# Patient Record
Sex: FEMALE | Race: WHITE | NOT HISPANIC OR LATINO | Employment: UNEMPLOYED | URBAN - METROPOLITAN AREA
[De-identification: names, ages, dates, MRNs, and addresses within clinical notes are randomized per-mention and may not be internally consistent; named-entity substitution may affect disease eponyms.]

---

## 2021-03-20 ENCOUNTER — HOSPITAL ENCOUNTER (EMERGENCY)
Facility: HOSPITAL | Age: 41
Discharge: HOME | End: 2021-03-20
Attending: EMERGENCY MEDICINE | Admitting: EMERGENCY MEDICINE
Payer: COMMERCIAL

## 2021-03-20 VITALS
RESPIRATION RATE: 18 BRPM | OXYGEN SATURATION: 99 % | TEMPERATURE: 98.4 F | DIASTOLIC BLOOD PRESSURE: 69 MMHG | HEART RATE: 90 BPM | SYSTOLIC BLOOD PRESSURE: 137 MMHG

## 2021-03-20 DIAGNOSIS — F41.8 ANXIETY ABOUT HEALTH: Primary | ICD-10-CM

## 2021-03-20 LAB
ALBUMIN SERPL BCP-MCNC: 4.4 G/DL (ref 3.5–5)
ALP SERPL-CCNC: 45 U/L (ref 46–116)
ALT SERPL W P-5'-P-CCNC: 30 U/L (ref 12–78)
AMPHETAMINES SERPL QL SCN: NEGATIVE
ANION GAP SERPL CALCULATED.3IONS-SCNC: 11 MMOL/L (ref 4–13)
AST SERPL W P-5'-P-CCNC: 17 U/L (ref 5–45)
BACTERIA UR QL AUTO: ABNORMAL /HPF
BARBITURATES UR QL: NEGATIVE
BASOPHILS # BLD AUTO: 0.08 THOUSANDS/ΜL (ref 0–0.1)
BASOPHILS NFR BLD AUTO: 1 % (ref 0–1)
BENZODIAZ UR QL: NEGATIVE
BILIRUB SERPL-MCNC: 0.3 MG/DL (ref 0.2–1)
BILIRUB UR QL STRIP: NEGATIVE
BUN SERPL-MCNC: 11 MG/DL (ref 5–25)
CALCIUM SERPL-MCNC: 8.9 MG/DL (ref 8.3–10.1)
CHLORIDE SERPL-SCNC: 104 MMOL/L (ref 100–108)
CK SERPL-CCNC: 72 U/L (ref 26–192)
CLARITY UR: CLEAR
CO2 SERPL-SCNC: 28 MMOL/L (ref 21–32)
COCAINE UR QL: NEGATIVE
COLOR UR: YELLOW
CREAT SERPL-MCNC: 0.95 MG/DL (ref 0.6–1.3)
CRP SERPL QL: 1.9 MG/L
EOSINOPHIL # BLD AUTO: 0.12 THOUSAND/ΜL (ref 0–0.61)
EOSINOPHIL NFR BLD AUTO: 2 % (ref 0–6)
ERYTHROCYTE [DISTWIDTH] IN BLOOD BY AUTOMATED COUNT: 12.6 % (ref 11.6–15.1)
EXT PREG TEST URINE: NEGATIVE
EXT. CONTROL ED NAV: NORMAL
GFR SERPL CREATININE-BSD FRML MDRD: 76 ML/MIN/1.73SQ M
GLUCOSE SERPL-MCNC: 89 MG/DL (ref 65–140)
GLUCOSE UR STRIP-MCNC: NEGATIVE MG/DL
HCT VFR BLD AUTO: 44.9 % (ref 34.8–46.1)
HGB BLD-MCNC: 14.6 G/DL (ref 11.5–15.4)
HGB UR QL STRIP.AUTO: ABNORMAL
IMM GRANULOCYTES # BLD AUTO: 0.02 THOUSAND/UL (ref 0–0.2)
IMM GRANULOCYTES NFR BLD AUTO: 0 % (ref 0–2)
KETONES UR STRIP-MCNC: NEGATIVE MG/DL
LEUKOCYTE ESTERASE UR QL STRIP: NEGATIVE
LYMPHOCYTES # BLD AUTO: 2.27 THOUSANDS/ΜL (ref 0.6–4.47)
LYMPHOCYTES NFR BLD AUTO: 28 % (ref 14–44)
MCH RBC QN AUTO: 29.7 PG (ref 26.8–34.3)
MCHC RBC AUTO-ENTMCNC: 32.5 G/DL (ref 31.4–37.4)
MCV RBC AUTO: 91 FL (ref 82–98)
METHADONE UR QL: NEGATIVE
MONOCYTES # BLD AUTO: 0.6 THOUSAND/ΜL (ref 0.17–1.22)
MONOCYTES NFR BLD AUTO: 7 % (ref 4–12)
NEUTROPHILS # BLD AUTO: 5.03 THOUSANDS/ΜL (ref 1.85–7.62)
NEUTS SEG NFR BLD AUTO: 62 % (ref 43–75)
NITRITE UR QL STRIP: NEGATIVE
NON-SQ EPI CELLS URNS QL MICRO: ABNORMAL /HPF
NRBC BLD AUTO-RTO: 0 /100 WBCS
OPIATES UR QL SCN: NEGATIVE
OXYCODONE+OXYMORPHONE UR QL SCN: NEGATIVE
PCP UR QL: NEGATIVE
PH UR STRIP.AUTO: 6 [PH]
PLATELET # BLD AUTO: 328 THOUSANDS/UL (ref 149–390)
PMV BLD AUTO: 9.4 FL (ref 8.9–12.7)
POTASSIUM SERPL-SCNC: 3.8 MMOL/L (ref 3.5–5.3)
PROT SERPL-MCNC: 8.4 G/DL (ref 6.4–8.2)
PROT UR STRIP-MCNC: NEGATIVE MG/DL
RBC # BLD AUTO: 4.91 MILLION/UL (ref 3.81–5.12)
RBC #/AREA URNS AUTO: ABNORMAL /HPF
SODIUM SERPL-SCNC: 143 MMOL/L (ref 136–145)
SP GR UR STRIP.AUTO: 1.02 (ref 1–1.03)
THC UR QL: POSITIVE
TSH SERPL DL<=0.05 MIU/L-ACNC: 1.45 UIU/ML (ref 0.36–3.74)
UROBILINOGEN UR QL STRIP.AUTO: 0.2 E.U./DL
WBC # BLD AUTO: 8.12 THOUSAND/UL (ref 4.31–10.16)
WBC #/AREA URNS AUTO: ABNORMAL /HPF

## 2021-03-20 PROCEDURE — 99283 EMERGENCY DEPT VISIT LOW MDM: CPT

## 2021-03-20 PROCEDURE — 80053 COMPREHEN METABOLIC PANEL: CPT | Performed by: EMERGENCY MEDICINE

## 2021-03-20 PROCEDURE — 81025 URINE PREGNANCY TEST: CPT | Performed by: EMERGENCY MEDICINE

## 2021-03-20 PROCEDURE — 82550 ASSAY OF CK (CPK): CPT | Performed by: EMERGENCY MEDICINE

## 2021-03-20 PROCEDURE — 86140 C-REACTIVE PROTEIN: CPT | Performed by: EMERGENCY MEDICINE

## 2021-03-20 PROCEDURE — 85025 COMPLETE CBC W/AUTO DIFF WBC: CPT | Performed by: EMERGENCY MEDICINE

## 2021-03-20 PROCEDURE — 84443 ASSAY THYROID STIM HORMONE: CPT | Performed by: EMERGENCY MEDICINE

## 2021-03-20 PROCEDURE — 80307 DRUG TEST PRSMV CHEM ANLYZR: CPT | Performed by: EMERGENCY MEDICINE

## 2021-03-20 PROCEDURE — 81001 URINALYSIS AUTO W/SCOPE: CPT | Performed by: EMERGENCY MEDICINE

## 2021-03-20 PROCEDURE — 99284 EMERGENCY DEPT VISIT MOD MDM: CPT | Performed by: EMERGENCY MEDICINE

## 2021-03-20 PROCEDURE — 96360 HYDRATION IV INFUSION INIT: CPT

## 2021-03-20 PROCEDURE — 93005 ELECTROCARDIOGRAM TRACING: CPT

## 2021-03-20 PROCEDURE — 36415 COLL VENOUS BLD VENIPUNCTURE: CPT | Performed by: EMERGENCY MEDICINE

## 2021-03-20 RX ADMIN — SODIUM CHLORIDE 1000 ML: 0.9 INJECTION, SOLUTION INTRAVENOUS at 16:03

## 2021-03-20 NOTE — DISCHARGE INSTRUCTIONS
Let your PCP know that you were here today and that we did EKG and lab testing  Call [facility] to find local internal medicine doctor  Call [facility] and tell them you were here today and we would like you to be seen soon  Follow-up with the neurologist as scheduled

## 2021-03-20 NOTE — ED PROVIDER NOTES
History  Chief Complaint   Patient presents with    Allergic Reaction     pt reports feeling ill after being exposed to corn  states feeling shortness of breath and some "cognitive" issues  states she stops and doesnt remember where she placed things  reports being put on nyastatin and thinks that maybe part of her symptoms were from that as well      67-year-old female states that she has had problems for over 5 years that include difficulty concentrating, myalgias, arthralgias, loss of appetite, difficulty with sleep, early satiety and fatigue  These became worse one year ago when they moved into a home that apparently had toxic mold  She was seen by a holistic health practitioner at the end of January and was placed on multiple medications but states that she just felt worse afterwards  She is concerned that she has a corn allergy and the nystatin she was given contains corn so she stopped taking that last week  She states that she is having more difficulties with short-term memory lapses and fatigue  Last night she felt that her temples were swollen and tender bilaterally but that has resolved  She has not been eating or drinking very much  Her urine was dark brown a few days ago but started clearing with increased fluid intake  She and her  feel that she needs to be checked for dehydration or other acute abnormalities  She has an appointment to see the holistic healthcare practitioners soon  She has an appointment with Neurology in a few weeks  In the meantime the home mold problem has been rectified  No one else at home is ill  Malaise - 7 years or greater  Severity:  Moderate  Onset quality:  Gradual  Duration: years    Timing:  Constant  Progression:  Worsening  Chronicity:  Chronic  Context: change in medication, decreased sleep, dehydration and stress    Context: not alcohol use, not drug use, not increased activity and not pinched nerve    Relieved by:  Nothing  Worsened by: Activity  Ineffective treatments:  Eating, medication, rest, sleep, lying down and drinking fluids  Associated symptoms: abdominal pain, anorexia, arthralgias, dizziness, headaches, lethargy, myalgias, nausea, near-syncope and sensory-motor deficit    Associated symptoms: no ataxia, no chest pain, no cough, no diarrhea, no difficulty walking, no drooling, no dysphagia, no dysuria, no numbness in extremities, no falls, no fever, no foul-smelling urine, no frequency, no hematochezia, no loss of consciousness, no melena, no seizures, no shortness of breath, no stroke symptoms, no syncope, no urgency and no vomiting    Risk factors: new medications    Risk factors: no anemia, no congestive heart failure, no coronary artery disease, no diabetes and no neurologic disease        None       History reviewed  No pertinent past medical history  Past Surgical History:   Procedure Laterality Date    BREAST SURGERY      CHOLECYSTECTOMY         History reviewed  No pertinent family history  I have reviewed and agree with the history as documented  E-Cigarette/Vaping     E-Cigarette/Vaping Substances     Social History     Tobacco Use    Smoking status: Never Smoker    Smokeless tobacco: Never Used   Substance Use Topics    Alcohol use: Never     Frequency: Never    Drug use: Never       Review of Systems   Constitutional: Negative for fever  HENT: Negative for drooling  Respiratory: Negative for cough and shortness of breath  Cardiovascular: Positive for near-syncope  Negative for chest pain and syncope  Gastrointestinal: Positive for abdominal pain, anorexia and nausea  Negative for diarrhea, dysphagia, hematochezia, melena and vomiting  Genitourinary: Negative for dysuria, frequency and urgency  Musculoskeletal: Positive for arthralgias and myalgias  Negative for falls  Neurological: Positive for dizziness and headaches  Negative for seizures and loss of consciousness         Physical Exam  Physical Exam  Vitals signs and nursing note reviewed  Constitutional:       General: She is not in acute distress  Appearance: She is well-developed and normal weight  She is not ill-appearing, toxic-appearing or diaphoretic  HENT:      Head: Normocephalic and atraumatic  Right Ear: Tympanic membrane, ear canal and external ear normal       Left Ear: Tympanic membrane, ear canal and external ear normal       Nose: Nose normal  No congestion  Mouth/Throat:      Mouth: Mucous membranes are moist       Pharynx: Oropharynx is clear  No oropharyngeal exudate or posterior oropharyngeal erythema  Eyes:      General: No scleral icterus  Extraocular Movements: Extraocular movements intact  Conjunctiva/sclera: Conjunctivae normal       Pupils: Pupils are equal, round, and reactive to light  Neck:      Musculoskeletal: Normal range of motion and neck supple  No muscular tenderness  Vascular: No carotid bruit  Cardiovascular:      Rate and Rhythm: Normal rate and regular rhythm  Pulses: Normal pulses  Heart sounds: No murmur  Pulmonary:      Effort: Pulmonary effort is normal       Breath sounds: Normal breath sounds  Abdominal:      General: Bowel sounds are normal       Palpations: Abdomen is soft  There is no mass  Tenderness: There is no abdominal tenderness  There is no guarding or rebound  Musculoskeletal: Normal range of motion  General: No swelling, tenderness or deformity  Right lower leg: No edema  Left lower leg: No edema  Lymphadenopathy:      Cervical: No cervical adenopathy  Skin:     General: Skin is warm and dry  Capillary Refill: Capillary refill takes less than 2 seconds  Coloration: Skin is not pale  Findings: No rash  Neurological:      General: No focal deficit present  Mental Status: She is alert and oriented to person, place, and time  Mental status is at baseline  Cranial Nerves: No cranial nerve deficit  Sensory: No sensory deficit  Motor: No weakness  Coordination: Coordination normal       Gait: Gait normal       Deep Tendon Reflexes: Reflexes are normal and symmetric  Reflexes normal    Psychiatric:         Behavior: Behavior normal       Comments: Anxious  When discussing symptoms and course of disease pulse rate increases and speech becomes rapid and somewhat pressured  When distracted pulse rate returns to normal range         Vital Signs  ED Triage Vitals   Temperature Pulse Respirations Blood Pressure SpO2   03/20/21 1518 03/20/21 1518 03/20/21 1518 03/20/21 1518 03/20/21 1518   98 4 °F (36 9 °C) 91 20 145/93 99 %      Temp Source Heart Rate Source Patient Position - Orthostatic VS BP Location FiO2 (%)   03/20/21 1518 03/20/21 1518 03/20/21 1518 03/20/21 1518 --   Temporal Monitor Lying Left arm       Pain Score       03/20/21 1730       No Pain           Vitals:    03/20/21 1518 03/20/21 1730   BP: 145/93 137/69   Pulse: 91 90   Patient Position - Orthostatic VS: Lying Lying         Visual Acuity      ED Medications  Medications   sodium chloride 0 9 % bolus 1,000 mL (0 mL Intravenous Stopped 3/20/21 1710)       Diagnostic Studies  Results Reviewed     Procedure Component Value Units Date/Time    CK Total with Reflex CKMB [[id number]]  (Normal) Collected: 03/20/21 1606    Lab Status: Final result Specimen: Blood from Arm, Right Updated: 03/20/21 1710     Total CK 72 U/L     POCT pregnancy, urine [[id number]]  (Normal) Resulted: 03/20/21 1707    Lab Status: Final result Updated: 03/20/21 1707     EXT PREG TEST UR (Ref: Negative) negative     Control valid    TSH [[id number]]  (Normal) Collected: 03/20/21 1606    Lab Status: Final result Specimen: Blood from Arm, Right Updated: 03/20/21 1639     TSH 3RD GENERATON 1 451 uIU/mL     Narrative:      Patients undergoing fluorescein dye angiography may retain small amounts of fluorescein in the body for 48-72 hours post procedure   Samples containing fluorescein can produce falsely depressed TSH values  If the patient had this procedure,a specimen should be resubmitted post fluorescein clearance        Urine Microscopic [[id number]]  (Abnormal) Collected: 03/20/21 1606    Lab Status: Final result Specimen: Urine, Clean Catch Updated: 03/20/21 1635     RBC, UA 20-30 /hpf      WBC, UA 0-1 /hpf      Epithelial Cells Occasional /hpf      Bacteria, UA None Seen /hpf     Comprehensive metabolic panel [[id number]]  (Abnormal) Collected: 03/20/21 1606    Lab Status: Final result Specimen: Blood from Arm, Right Updated: 03/20/21 1630     Sodium 143 mmol/L      Potassium 3 8 mmol/L      Chloride 104 mmol/L      CO2 28 mmol/L      ANION GAP 11 mmol/L      BUN 11 mg/dL      Creatinine 0 95 mg/dL      Glucose 89 mg/dL      Calcium 8 9 mg/dL      AST 17 U/L      ALT 30 U/L      Alkaline Phosphatase 45 U/L      Total Protein 8 4 g/dL      Albumin 4 4 g/dL      Total Bilirubin 0 30 mg/dL      eGFR 76 ml/min/1 73sq m     Narrative:      [place] guidelines for Chronic Kidney Disease (CKD):     Stage 1 with normal or high GFR (GFR > 90 mL/min/1 73 square meters)    Stage 2 Mild CKD (GFR = 60-89 mL/min/1 73 square meters)    Stage 3A Moderate CKD (GFR = 45-59 mL/min/1 73 square meters)    Stage 3B Moderate CKD (GFR = 30-44 mL/min/1 73 square meters)    Stage 4 Severe CKD (GFR = 15-29 mL/min/1 73 square meters)    Stage 5 End Stage CKD (GFR <15 mL/min/1 73 square meters)  Note: GFR calculation is accurate only with a steady state creatinine    C-reactive protein [[id number]]  (Normal) Collected: 03/20/21 1606    Lab Status: Final result Specimen: Blood from Arm, Right Updated: 03/20/21 1630     CRP 1 9 mg/L     Rapid drug screen, urine [[id number]]  (Abnormal) Collected: 03/20/21 1606    Lab Status: Final result Specimen: Urine, Clean Catch Updated: 03/20/21 1628     Amph/Meth UR Negative     Barbiturate Ur Negative     Benzodiazepine Urine Negative Cocaine Urine Negative     Methadone Urine Negative     Opiate Urine Negative     PCP Ur Negative     THC Urine Positive     Oxycodone Urine Negative    Narrative:      Presumptive report  If requested, specimen will be sent to reference lab for confirmation  FOR MEDICAL PURPOSES ONLY  IF CONFIRMATION NEEDED PLEASE CONTACT THE LAB WITHIN 5 DAYS      Drug Screen Cutoff Levels:  AMPHETAMINE/METHAMPHETAMINES  1000 ng/mL  BARBITURATES     200 ng/mL  BENZODIAZEPINES     200 ng/mL  COCAINE      300 ng/mL  METHADONE      300 ng/mL  OPIATES      300 ng/mL  PHENCYCLIDINE     25 ng/mL  THC       50 ng/mL  OXYCODONE      100 ng/mL    UA (URINE) with reflex to Scope [[id number]]  (Abnormal) Collected: 03/20/21 1606    Lab Status: Final result Specimen: Urine, Clean Catch Updated: 03/20/21 1620     Color, UA Yellow     Clarity, UA Clear     Specific [place], UA 1 020     pH, UA 6 0     Leukocytes, UA Negative     Nitrite, UA Negative     Protein, UA Negative mg/dl      Glucose, UA Negative mg/dl      Ketones, UA Negative mg/dl      Urobilinogen, UA 0 2 E U /dl      Bilirubin, UA Negative     Blood, UA Large    CBC and differential [[id number]] Collected: 03/20/21 1606    Lab Status: Final result Specimen: Blood from Arm, Right Updated: 03/20/21 1612     WBC 8 12 Thousand/uL      RBC 4 91 Million/uL      Hemoglobin 14 6 g/dL      Hematocrit 44 9 %      MCV 91 fL      MCH 29 7 pg      MCHC 32 5 g/dL      RDW 12 6 %      MPV 9 4 fL      Platelets 395 Thousands/uL      nRBC 0 /100 WBCs      Neutrophils Relative 62 %      Immat GRANS % 0 %      Lymphocytes Relative 28 %      Monocytes Relative 7 %      Eosinophils Relative 2 %      Basophils Relative 1 %      Neutrophils Absolute 5 03 Thousands/µL      Immature Grans Absolute 0 02 Thousand/uL      Lymphocytes Absolute 2 27 Thousands/µL      Monocytes Absolute 0 60 Thousand/µL      Eosinophils Absolute 0 12 Thousand/µL      Basophils Absolute 0 08 Thousands/µL                  No orders to display              Procedures  ECG 12 Lead Documentation Only    Date/Time: 3/20/2021 3:36 PM  Performed by: Daxa Campo DO  Authorized by: Daxa Campo DO     ECG reviewed by me, the ED Provider: yes    Patient location:  ED  Previous ECG:     Previous ECG:  Unavailable    Comparison to cardiac monitor: Yes    Rhythm:     Rhythm: sinus tachycardia    Ectopy:     Ectopy: none    QRS:     QRS axis:  Normal    QRS intervals:  Normal  Conduction:     Conduction: normal    ST segments:     ST segments:  Normal  T waves:     T waves: normal               ED Course        vital signs remained stable other than transient tachycardia when discussing symptoms  No focal neurologic deficits exhibited  Had long discussion with patient about possible causes and plans for follow-up  Instructed to call in filling to find local Internal Medicine physician, to follow up with Neurology and with her holistic health practitioners  Also recommended follow-up with gastroenterologist for ongoing GI issues  MDM  Number of Diagnoses or Management Options  Anxiety about health: established and worsening  Diagnosis management comments: Patient has multiple complaints the she believes are referable to toxic mold in the house that has since been remediated  Patient concerned that some medications given to her by her holistic practitioner may be causing her allergies  On exam and per diagnostic testing results patient has no evidence of acute neurologic, infectious or cardiopulmonary disorder  Patient will need further workup as an outpatient  Stable for discharge         Amount and/or Complexity of Data Reviewed  Clinical lab tests: ordered and reviewed  Independent visualization of images, tracings, or specimens: yes        Disposition  Final diagnoses:   Anxiety about health     Time reflects when diagnosis was documented in both MDM as applicable and the Disposition within this note     Time User Action Codes Description Comment    3/20/2021  5:23 PM Gildardola Stacey Add [F41 8] Anxiety about health       ED Disposition     ED Disposition Condition Date/Time Comment    Discharge Stable Sat Mar 20, 2021  5:23 PM Arin Speak discharge to home/self care  Follow-up Information     Follow up With Specialties Details Why Contact Info Additional Information    Khushboo Arriaza DO Family Medicine Call in 2 days  [address] [facility] [address]  [place] [address] [facility] Schedule an appointment as soon as possible for a visit   [address] [place] [phone]  [facility] [address] [place] [address], [address] [place] [address], [id number] [phone] [address]  Call in 2 days To find a local internal medicine doctor [phone]             There are no discharge medications for this patient  No discharge procedures on file      PDMP Review       Value Time User    PDMP Reviewed  Yes 3/20/2021  5:23 PM Arie Us DO          ED Provider  Electronically Signed by           Arie Us DO  03/21/21 1141

## 2021-03-22 LAB
ATRIAL RATE: 108 BPM
P AXIS: 72 DEGREES
PR INTERVAL: 128 MS
QRS AXIS: 81 DEGREES
QRSD INTERVAL: 80 MS
QT INTERVAL: 342 MS
QTC INTERVAL: 458 MS
T WAVE AXIS: 38 DEGREES
VENTRICULAR RATE: 108 BPM

## 2021-03-22 PROCEDURE — 93010 ELECTROCARDIOGRAM REPORT: CPT | Performed by: INTERNAL MEDICINE

## 2021-09-01 ENCOUNTER — OFFICE VISIT (OUTPATIENT)
Dept: GASTROENTEROLOGY | Facility: CLINIC | Age: 41
End: 2021-09-01
Payer: COMMERCIAL

## 2021-09-01 VITALS
SYSTOLIC BLOOD PRESSURE: 118 MMHG | HEIGHT: 66 IN | DIASTOLIC BLOOD PRESSURE: 70 MMHG | BODY MASS INDEX: 22.73 KG/M2 | WEIGHT: 141.4 LBS

## 2021-09-01 DIAGNOSIS — R63.4 WEIGHT LOSS, UNINTENTIONAL: ICD-10-CM

## 2021-09-01 DIAGNOSIS — R10.13 EPIGASTRIC PAIN: Primary | ICD-10-CM

## 2021-09-01 DIAGNOSIS — Z86.010 PERSONAL HISTORY OF COLONIC POLYPS: ICD-10-CM

## 2021-09-01 DIAGNOSIS — Z91.09 ALLERGY TO MOLD SPORES: ICD-10-CM

## 2021-09-01 DIAGNOSIS — Z88.9 PERSONAL HISTORY OF ALLERGY TO MEDICINAL AGENT: ICD-10-CM

## 2021-09-01 PROCEDURE — 99204 OFFICE O/P NEW MOD 45 MIN: CPT | Performed by: INTERNAL MEDICINE

## 2021-09-01 RX ORDER — ACETAMINOPHEN 500 MG
500 TABLET ORAL
COMMUNITY

## 2021-09-01 RX ORDER — SODIUM PICOSULFATE, MAGNESIUM OXIDE, AND ANHYDROUS CITRIC ACID 10; 3.5; 12 MG/160ML; G/160ML; G/160ML
LIQUID ORAL
Qty: 320 ML | Refills: 0 | Status: SHIPPED | COMMUNITY
Start: 2021-09-01 | End: 2021-09-21 | Stop reason: HOSPADM

## 2021-09-01 RX ORDER — FAMOTIDINE 20 MG/1
20 TABLET, FILM COATED ORAL 2 TIMES DAILY
COMMUNITY
Start: 2021-08-12 | End: 2021-09-28

## 2021-09-01 NOTE — LETTER
September 2, 2021     Gonsalo Olivas, [address] [address]    Patient: Mabel Zhu   YOB: 1980   Date of Visit: 9/1/2021       Dear Dr Mohsen Campbell:    Thank you for referring Mabel Zhu to me for evaluation  Below are my notes for this consultation  If you have questions, please do not hesitate to call me  I look forward to following your patient along with you  Sincerely,        Bartolo Jennings MD        CC: No Recipients  Bartolo Jennings MD  9/2/2021  9:33 AM  Incomplete    [address] [facility]  Mabel Zhu 37 y o  female MRN: [id number]  Encounter: [id number]    ASSESSMENT AND PLAN:      1  Epigastric pain  --With a 6 month history of epigastric abdominal pain with radiation to the back  Partial relief with Pepcid  Some associated nausea  Has had weight loss as well  Previous intolerance and allergy to Prilosec  Differential diagnosis could include peptic ulcer disease however ulcers or typically healed within several weeks of treatment with famotidine, gastritis, H pylori infection, in the differential diagnosis  Functional abdominal pain is a possibility  Pancreatic disease could be considered as well given the location    -- proceed with EGD at [address] abdomen complete; Future  - Amylase; Future  - Lipase; Future    2  Weight loss, unintentional  -- related to problem 1   - C-reactive protein; Future    3  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  --- patient with significant environmental exposure fungal toxins in recently purchased house  Patient is moved out from that the [place] as it undergoes remain ED age    3  Personal history of allergy to medicinal agent   previous history of reaction with rash to Prilosec    5 Personal history of colon polyps  - patient reports more loose stools in the last year so  No blood per rectum    Does report having had a colonoscopy about about 10 years ago and was told that she had colon polyps and that she should come back in 5 years  -has some associated change in bowel habit in the past year    --Colonnoscopy at [facility]    Followup Appointment:  3 mo  ______________________________________________________________________    Chief Complaint   Patient presents with    Abdominal Pain       HPI:   Emelia Mejia is a 37y o  year old female who presents  For evaluation of ongoing problems with abdominal pain  Patient reports since the spring of this year having fairly significant epigastric abdominal pain with radiation to the back  This is sometimes aggravated by eating  She does have some associated nausea  Patient was actually seen in emergency department at [facility] and [facility] back in the spring  Lab work in abdominal x-ray with performed  Patient was advised that she may have peptic ulcer disease or gastritis  He has had significant weight loss upwards of 35 lb because of trouble with eating  She has not had vomiting  There is no use of nonsteroidal anti-inflammatories  The patient did have an upper endoscopy in the remote past there is no known history of peptic ulcer disease  Over the last couple months the patient has been taking famotidine twice a day  She seems to feel that this has helped the discomfort  Pain remains continuous there and there most of the time  Patient has been under treatment for exposure to environmental toxins  Patient and her  purchased a new home in the spring of 2020  Patient began having more health problems subsequently and has tested positive for allergy to fungal spores and mycotoxins related to the patient's new home  May have been an apartment over the last several months while the home undergoes room ED a shin  She is receiving therapy for that by her physician but did have some side effects with the treatment      She had a course of steroids for her allergic reactions  Patient does report in the remote past she had been placed on omeprazole  Patient had allergic reaction from that medication with significant rash  She actually was  challenged again with the medication and the rash resulted  once more  Patient does report some change in her bowel habit  Specifically she reports having some loose stool  This was not the case about a year ago  There is no family history of colorectal cancer  She has not had any rectal bleeding  She informs me however that she had a colonoscopy about 10 years ago  Despite being quite young she reports having had a history of colon polyps  She was advised to follow up with a colonoscopy 5 years subsequently but this was not done  Historical Information   Past Medical History:   Diagnosis Date    GERD (gastroesophageal reflux disease)      Past Surgical History:   Procedure Laterality Date    BREAST SURGERY      CHOLECYSTECTOMY       Social History     Substance and Sexual Activity   Alcohol Use Never     Social History     Substance and Sexual Activity   Drug Use Never     Social History     Tobacco Use   Smoking Status Never Smoker   Smokeless Tobacco Never Used     Family History   Problem Relation Age of Onset    Brain cancer Mother     Stomach cancer Father     Autoimmune disease Father     No Known Problems Sister     No Known Problems Brother     No Known Problems Sister     No Known Problems Brother     Colon polyps Neg Hx     Colon cancer Neg Hx        Meds/Allergies     Current Outpatient Medications:     famotidine (PEPCID) 20 mg tablet    acetaminophen (TYLENOL) 500 mg tablet    Allergies   Allergen Reactions    Diclegis [Doxylamine-Pyridoxine] Hives    Doxycycline Hives    Nyamyc [Nystatin] GI Intolerance    Prilosec [Omeprazole] Hives       PHYSICAL EXAM:    Blood pressure 118/70, height 5' 6" (1 676 m), weight 64 1 kg (141 lb 6 4 oz)  Body mass index is 22 82 kg/m²    General Appearance: NAD, cooperative, alert- thin  Eyes: Anicteric,  Conjunctiva pink  ENT:  Normocephalic, atraumatic, normal mucosa  Neck:  Supple, symmetrical, trachea midline,   Resp:  Clear to auscultation bilaterally; no rales, rhonchi or wheezing; respirations unlabored   CV:  S1 S2, Regular rate and rhythm; no murmur, rub, or gallop  GI:  Soft,  Very mild epigastric abdominal tenderness no guarding or rebound, non-distended; normal bowel sounds; no masses, no organomegaly   Rectal: Deferred  Musculoskeletal: No cyanosis, clubbing or edema  Normal ROM  Skin:  No jaundice, rashes, or lesions   Heme/Lymph: No palpable cervical lymphadenopathy  Psych: Normal affect, good eye contact  Neuro: No gross deficits, AAOx3    Lab Results:   Lab Results   Component Value Date    WBC 8 12 03/20/2021    HGB 14 6 03/20/2021    HCT 44 9 03/20/2021    MCV 91 03/20/2021     03/20/2021     Lab Results   Component Value Date    K 3 8 03/20/2021     03/20/2021    CO2 28 03/20/2021    BUN 11 03/20/2021    CREATININE 0 95 03/20/2021    CALCIUM 8 9 03/20/2021    AST 17 03/20/2021    ALT 30 03/20/2021    ALKPHOS 45 (L) 03/20/2021    EGFR 76 03/20/2021           REVIEW OF SYSTEMS:    CONSTITUTIONAL: Denies any fever, chills, rigors, positive for involuntary weight loss  HEENT: No earache or tinnitus  Positive for I congestion  CARDIOVASCULAR:  Positive for palpitations  RESPIRATORY: Denies any cough, hemoptysis, shortness of breath or dyspnea on exertion  GASTROINTESTINAL: As noted in the History of Present Illness  GENITOURINARY: No problems with urination  Denies any hematuria or dysuria  NEUROLOGIC:  Positive for dizziness without vertigo positive for headaches  MUSCULOSKELETAL: Denies any muscle or joint pain  SKIN: Denies skin rashes or itching  ENDOCRINE: Denies excessive thirst  Denies intolerance to heat or cold  PSYCHOSOCIAL:  Positive for anxious feelings  Denies any recent memory loss       Chucho Navas MD  9/1/2021  8:38 AM  Sign when Signing Visit    [address] [facility] - Outpatient [facility]  Romeo Kumar 37 y o  female MRN: [id number]  Encounter: [id number]    ASSESSMENT AND PLAN:      1  Epigastric pain  --With a 6 month history of epigastric abdominal pain with radiation to the back  Partial relief with Pepcid  Some associated nausea  Has had weight loss as well  Previous intolerance and allergy to Prilosec  Differential diagnosis could include peptic ulcer disease however ulcers or typically healed within several weeks of treatment with famotidine, gastritis, H pylori infection, in the differential diagnosis  Functional abdominal pain is a possibility  Pancreatic disease could be considered as well given the location    -- proceed with EGD at [place] [address] 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  --- patient with significant environmental exposure fungal toxins in recently purchased house  Patient is moved out from that the [place] as it undergoes remain ED age    3  Personal history of allergy to medicinal agent   previous history of reaction with rash to Prilosec    5 Personal history of colon polyps  - patient reports more loose stools in the last year so  No blood per rectum    Does report having had a colonoscopy about about 10 years ago and was told that she had colon polyps and that she should come back in 5 years  --Colonnoscopy at [facility]    Followup Appointment:  3 mo  ______________________________________________________________________    Chief Complaint   Patient presents with    Abdominal Pain       HPI:   Romeo Kumar is a 37y o  year old female who presents ***    Historical Information   Past Medical History:   Diagnosis Date    GERD (gastroesophageal reflux disease)      Past Surgical History:   Procedure Laterality Date    BREAST SURGERY  CHOLECYSTECTOMY       Social History     Substance and Sexual Activity   Alcohol Use Never     Social History     Substance and Sexual Activity   Drug Use Never     Social History     Tobacco Use   Smoking Status Never Smoker   Smokeless Tobacco Never Used     Family History   Problem Relation Age of Onset    Brain cancer Mother     Stomach cancer Father     Autoimmune disease Father     No Known Problems Sister     No Known Problems Brother     No Known Problems Sister     No Known Problems Brother     Colon polyps Neg Hx     Colon cancer Neg Hx        Meds/Allergies     Current Outpatient Medications:     famotidine (PEPCID) 20 mg tablet    acetaminophen (TYLENOL) 500 mg tablet    Allergies   Allergen Reactions    Diclegis [Doxylamine-Pyridoxine] Hives    Doxycycline Hives    Nyamyc [Nystatin] GI Intolerance    Prilosec [Omeprazole] Hives       PHYSICAL EXAM:    Blood pressure 118/70, height 5' 6" (1 676 m), weight 64 1 kg (141 lb 6 4 oz)  Body mass index is 22 82 kg/m²  General Appearance: NAD, cooperative, alert  Eyes: Anicteric, PERRLA, EOMI  ENT:  Normocephalic, atraumatic, normal mucosa  Neck:  Supple, symmetrical, trachea midline,   Resp:  Clear to auscultation bilaterally; no rales, rhonchi or wheezing; respirations unlabored   CV:  S1 S2, Regular rate and rhythm; no murmur, rub, or gallop  GI:  Soft, non-tender, non-distended; normal bowel sounds; no masses, no organomegaly   Rectal: Deferred  Musculoskeletal: No cyanosis, clubbing or edema  Normal ROM    Skin:  No jaundice, rashes, or lesions   Heme/Lymph: No palpable cervical lymphadenopathy  Psych: Normal affect, good eye contact  Neuro: No gross deficits, AAOx3    Lab Results:   Lab Results   Component Value Date    WBC 8 12 03/20/2021    HGB 14 6 03/20/2021    HCT 44 9 03/20/2021    MCV 91 03/20/2021     03/20/2021     Lab Results   Component Value Date    K 3 8 03/20/2021     03/20/2021    CO2 28 03/20/2021    BUN 11 03/20/2021    CREATININE 0 95 03/20/2021    CALCIUM 8 9 03/20/2021    AST 17 03/20/2021    ALT 30 03/20/2021    ALKPHOS 45 (L) 03/20/2021    EGFR 76 03/20/2021     No results found for: IRON, TIBC, FERRITIN  No results found for: LIPASE    Radiology Results:   No results found  REVIEW OF SYSTEMS:    CONSTITUTIONAL: Denies any fever, chills, rigors, and weight loss  HEENT: No earache or tinnitus  Denies hearing loss or visual disturbances  CARDIOVASCULAR: No chest pain or palpitations  RESPIRATORY: Denies any cough, hemoptysis, shortness of breath or dyspnea on exertion  GASTROINTESTINAL: As noted in the History of Present Illness  GENITOURINARY: No problems with urination  Denies any hematuria or dysuria  NEUROLOGIC: No dizziness or vertigo, denies headaches  MUSCULOSKELETAL: Denies any muscle or joint pain  SKIN: Denies skin rashes or itching  ENDOCRINE: Denies excessive thirst  Denies intolerance to heat or cold  PSYCHOSOCIAL: Denies depression or anxiety  Denies any recent memory loss

## 2021-09-01 NOTE — PATIENT INSTRUCTIONS
[facility] - Outpatient Consultation  Evelio Mallory 37 y o  female MRN: [id number]  Encounter: [id number]    ASSESSMENT AND PLAN:      1  Epigastric pain  --With a 6 month history of epigastric abdominal pain with radiation to the back  Partial relief with Pepcid  Some associated nausea  Has had weight loss as well  Previous intolerance and allergy to Prilosec  Differential diagnosis could include peptic ulcer disease however ulcers or typically healed within several weeks of treatment with famotidine, gastritis, H pylori infection, in the differential diagnosis  Functional abdominal pain is a possibility  Pancreatic disease could be considered as well given the location    -- proceed with EGD at [address] abdomen complete; Future  - Amylase; Future  - Lipase; Future    2  Weight loss, unintentional  -- related to problem 1   - C-reactive protein; Future    3  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  --- patient with significant environmental exposure fungal toxins in recently purchased house  Patient is moved out from that the [place] as it undergoes remain ED age    3  Personal history of allergy to medicinal agent   previous history of reaction with rash to Prilosec    5 Personal history of colon polyps  - patient reports more loose stools in the last year so  No blood per rectum    Does report having had a colonoscopy about about 10 years ago and was told that she had colon polyps and that she should come back in 5 years  -has some associated change in bowel habit in the past year   --Colonnoscopy at [facility]    Followup Appointment:  3 mo Pt with depression, suicidal thoughts tonight.

## 2021-09-01 NOTE — H&P (VIEW-ONLY)
[facility] - Outpatient Consultation  Roxanne Dawson 37 y o  female MRN: [id number]  Encounter: [id number]    ASSESSMENT AND PLAN:      1  Epigastric pain  --With a 6 month history of epigastric abdominal pain with radiation to the back  Partial relief with Pepcid  Some associated nausea  Has had weight loss as well  Previous intolerance and allergy to Prilosec  Differential diagnosis could include peptic ulcer disease however ulcers or typically healed within several weeks of treatment with famotidine, gastritis, H pylori infection, in the differential diagnosis  Functional abdominal pain is a possibility  Pancreatic disease could be considered as well given the location    -- proceed with EGD at [place] [address] [facility] abdomen complete; Future  - Amylase; Future  - Lipase; Future    2  Weight loss, unintentional  -- related to problem 1   - C-reactive protein; Future    3  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  --- patient with significant environmental exposure fungal toxins in recently purchased house  Patient is moved out from that the [place] as it undergoes remain ED age    3  Personal history of allergy to medicinal agent   previous history of reaction with rash to Prilosec    5 Personal history of colon polyps  - patient reports more loose stools in the last year so  No blood per rectum  Does report having had a colonoscopy about about 10 years ago and was told that she had colon polyps and that she should come back in 5 years  -has some associated change in bowel habit in the past year    --Colonnoscopy at [facility]    Followup Appointment:  3 mo  ______________________________________________________________________    Chief Complaint   Patient presents with    Abdominal Pain       HPI:   Roxanne Dawson is a 37y o  year old female who presents  For evaluation of ongoing problems with abdominal pain    Patient reports since the spring of this year having fairly significant epigastric abdominal pain with radiation to the back  This is sometimes aggravated by eating  She does have some associated nausea  Patient was actually seen in emergency department at [address] [place] [address] back in the spring  Lab work in abdominal x-ray with performed  Patient was advised that she may have peptic ulcer disease or gastritis  He has had significant weight loss upwards of 35 lb because of trouble with eating  She has not had vomiting  There is no use of nonsteroidal anti-inflammatories  The patient did have an upper endoscopy in the remote past there is no known history of peptic ulcer disease  Over the last couple months the patient has been taking famotidine twice a day  She seems to feel that this has helped the discomfort  Pain remains continuous there and there most of the time  Patient has been under treatment for exposure to environmental toxins  Patient and her  purchased a new home in the spring of 2020  Patient began having more health problems subsequently and has tested positive for allergy to fungal spores and mycotoxins related to the patient's new home  May have been an apartment over the last several months while the home undergoes room ED a shin  She is receiving therapy for that by her physician but did have some side effects with the treatment  She had a course of steroids for her allergic reactions  Patient does report in the remote past she had been placed on omeprazole  Patient had allergic reaction from that medication with significant rash  She actually was  challenged again with the medication and the rash resulted  once more  Patient does report some change in her bowel habit  Specifically she reports having some loose stool  This was not the case about a year ago  There is no family history of colorectal cancer  She has not had any rectal bleeding  She informs me however that she had a colonoscopy about 10 years ago  Despite being quite young she reports having had a history of colon polyps  She was advised to follow up with a colonoscopy 5 years subsequently but this was not done  Historical Information   Past Medical History:   Diagnosis Date    GERD (gastroesophageal reflux disease)      Past Surgical History:   Procedure Laterality Date    BREAST SURGERY      CHOLECYSTECTOMY       Social History     Substance and Sexual Activity   Alcohol Use Never     Social History     Substance and Sexual Activity   Drug Use Never     Social History     Tobacco Use   Smoking Status Never Smoker   Smokeless Tobacco Never Used     Family History   Problem Relation Age of Onset    Brain cancer Mother     Stomach cancer Father     Autoimmune disease Father     No Known Problems Sister     No Known Problems Brother     No Known Problems Sister     No Known Problems Brother     Colon polyps Neg Hx     Colon cancer Neg Hx        Meds/Allergies     Current Outpatient Medications:     famotidine (PEPCID) 20 mg tablet    acetaminophen (TYLENOL) 500 mg tablet    Allergies   Allergen Reactions    Diclegis [Doxylamine-Pyridoxine] Hives    Doxycycline Hives    Nyamyc [Nystatin] GI Intolerance    Prilosec [Omeprazole] Hives       PHYSICAL EXAM:    Blood pressure 118/70, height 5' 6" (1 676 m), weight 64 1 kg (141 lb 6 4 oz)  Body mass index is 22 82 kg/m²  General Appearance: NAD, cooperative, alert- thin  Eyes: Anicteric,  Conjunctiva pink  ENT:  Normocephalic, atraumatic, normal mucosa  Neck:  Supple, symmetrical, trachea midline,   Resp:  Clear to auscultation bilaterally; no rales, rhonchi or wheezing; respirations unlabored   CV:  S1 S2, Regular rate and rhythm; no murmur, rub, or gallop  GI:  Soft,  Very mild epigastric abdominal tenderness no guarding or rebound, non-distended; normal bowel sounds; no masses, no organomegaly   Rectal: Deferred  Musculoskeletal: No cyanosis, clubbing or edema  Normal ROM    Skin:  No jaundice, rashes, or lesions   Heme/Lymph: No palpable cervical lymphadenopathy  Psych: Normal affect, good eye contact  Neuro: No gross deficits, AAOx3    Lab Results:   Lab Results   Component Value Date    WBC 8 12 03/20/2021    HGB 14 6 03/20/2021    HCT 44 9 03/20/2021    MCV 91 03/20/2021     03/20/2021     Lab Results   Component Value Date    K 3 8 03/20/2021     03/20/2021    CO2 28 03/20/2021    BUN 11 03/20/2021    CREATININE 0 95 03/20/2021    CALCIUM 8 9 03/20/2021    AST 17 03/20/2021    ALT 30 03/20/2021    ALKPHOS 45 (L) 03/20/2021    EGFR 76 03/20/2021           REVIEW OF SYSTEMS:    CONSTITUTIONAL: Denies any fever, chills, rigors, positive for involuntary weight loss  HEENT: No earache or tinnitus  Positive for I congestion  CARDIOVASCULAR:  Positive for palpitations  RESPIRATORY: Denies any cough, hemoptysis, shortness of breath or dyspnea on exertion  GASTROINTESTINAL: As noted in the History of Present Illness  GENITOURINARY: No problems with urination  Denies any hematuria or dysuria  NEUROLOGIC:  Positive for dizziness without vertigo positive for headaches  MUSCULOSKELETAL: Denies any muscle or joint pain  SKIN: Denies skin rashes or itching  ENDOCRINE: Denies excessive thirst  Denies intolerance to heat or cold  PSYCHOSOCIAL:  Positive for anxious feelings  Denies any recent memory loss

## 2021-09-01 NOTE — PROGRESS NOTES
[id number] - Outpatient Consultation  Alberta Hills 37 y o  female MRN: [id number]  Encounter: [id number]    ASSESSMENT AND PLAN:      1  Epigastric pain  --With a 6 month history of epigastric abdominal pain with radiation to the back  Partial relief with Pepcid  Some associated nausea  Has had weight loss as well  Previous intolerance and allergy to Prilosec  Differential diagnosis could include peptic ulcer disease however ulcers or typically healed within several weeks of treatment with famotidine, gastritis, H pylori infection, in the differential diagnosis  Functional abdominal pain is a possibility  Pancreatic disease could be considered as well given the location    -- proceed with EGD at [place] [id number] abdomen complete; Future  - Amylase; Future  - Lipase; Future    2  Weight loss, unintentional  -- related to problem 1   - C-reactive protein; Future    3  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  --- patient with significant environmental exposure fungal toxins in recently purchased house  Patient is moved out from that the [place] as it undergoes remain ED age    3  Personal history of allergy to medicinal agent   previous history of reaction with rash to Prilosec    5 Personal history of colon polyps  - patient reports more loose stools in the last year so  No blood per rectum  Does report having had a colonoscopy about about 10 years ago and was told that she had colon polyps and that she should come back in 5 years  -has some associated change in bowel habit in the past year    --Colonnoscopy at [facility]    Followup Appointment:  3 mo  ______________________________________________________________________    Chief Complaint   Patient presents with    Abdominal Pain       HPI:   Alberta Hills is a 37y o  year old female who presents  For evaluation of ongoing problems with abdominal pain    Patient reports since the spring of this year having fairly significant epigastric abdominal pain with radiation to the back  This is sometimes aggravated by eating  She does have some associated nausea  Patient was actually seen in emergency department at [facility] back in the spring  Lab work in abdominal x-ray with performed  Patient was advised that she may have peptic ulcer disease or gastritis  He has had significant weight loss upwards of 35 lb because of trouble with eating  She has not had vomiting  There is no use of nonsteroidal anti-inflammatories  The patient did have an upper endoscopy in the remote past there is no known history of peptic ulcer disease  Over the last couple months the patient has been taking famotidine twice a day  She seems to feel that this has helped the discomfort  Pain remains continuous there and there most of the time  Patient has been under treatment for exposure to environmental toxins  Patient and her  purchased a new home in the spring of 2020  Patient began having more health problems subsequently and has tested positive for allergy to fungal spores and mycotoxins related to the patient's new home  May have been an apartment over the last several months while the home undergoes room ED a shin  She is receiving therapy for that by her physician but did have some side effects with the treatment  She had a course of steroids for her allergic reactions  Patient does report in the remote past she had been placed on omeprazole  Patient had allergic reaction from that medication with significant rash  She actually was  challenged again with the medication and the rash resulted  once more  Patient does report some change in her bowel habit  Specifically she reports having some loose stool  This was not the case about a year ago  There is no family history of colorectal cancer  She has not had any rectal bleeding  She informs me however that she had a colonoscopy about 10 years ago  Despite being quite young she reports having had a history of colon polyps  She was advised to follow up with a colonoscopy 5 years subsequently but this was not done  Historical Information   Past Medical History:   Diagnosis Date    GERD (gastroesophageal reflux disease)      Past Surgical History:   Procedure Laterality Date    BREAST SURGERY      CHOLECYSTECTOMY       Social History     Substance and Sexual Activity   Alcohol Use Never     Social History     Substance and Sexual Activity   Drug Use Never     Social History     Tobacco Use   Smoking Status Never Smoker   Smokeless Tobacco Never Used     Family History   Problem Relation Age of Onset    Brain cancer Mother     Stomach cancer Father     Autoimmune disease Father     No Known Problems Sister     No Known Problems Brother     No Known Problems Sister     No Known Problems Brother     Colon polyps Neg Hx     Colon cancer Neg Hx        Meds/Allergies     Current Outpatient Medications:     famotidine (PEPCID) 20 mg tablet    acetaminophen (TYLENOL) 500 mg tablet    Allergies   Allergen Reactions    Diclegis [Doxylamine-Pyridoxine] Hives    Doxycycline Hives    Nyamyc [Nystatin] GI Intolerance    Prilosec [Omeprazole] Hives       PHYSICAL EXAM:    Blood pressure 118/70, height 5' 6" (1 676 m), weight 64 1 kg (141 lb 6 4 oz)  Body mass index is 22 82 kg/m²  General Appearance: NAD, cooperative, alert- thin  Eyes: Anicteric,  Conjunctiva pink  ENT:  Normocephalic, atraumatic, normal mucosa  Neck:  Supple, symmetrical, trachea midline,   Resp:  Clear to auscultation bilaterally; no rales, rhonchi or wheezing; respirations unlabored   CV:  S1 S2, Regular rate and rhythm; no murmur, rub, or gallop  GI:  Soft,  Very mild epigastric abdominal tenderness no guarding or rebound, non-distended; normal bowel sounds; no masses, no organomegaly   Rectal: Deferred  Musculoskeletal: No cyanosis, clubbing or edema  Normal ROM    Skin:  No jaundice, rashes, or lesions   Heme/Lymph: No palpable cervical lymphadenopathy  Psych: Normal affect, good eye contact  Neuro: No gross deficits, AAOx3    Lab Results:   Lab Results   Component Value Date    WBC 8 12 03/20/2021    HGB 14 6 03/20/2021    HCT 44 9 03/20/2021    MCV 91 03/20/2021     03/20/2021     Lab Results   Component Value Date    K 3 8 03/20/2021     03/20/2021    CO2 28 03/20/2021    BUN 11 03/20/2021    CREATININE 0 95 03/20/2021    CALCIUM 8 9 03/20/2021    AST 17 03/20/2021    ALT 30 03/20/2021    ALKPHOS 45 (L) 03/20/2021    EGFR 76 03/20/2021           REVIEW OF SYSTEMS:    CONSTITUTIONAL: Denies any fever, chills, rigors, positive for involuntary weight loss  HEENT: No earache or tinnitus  Positive for I congestion  CARDIOVASCULAR:  Positive for palpitations  RESPIRATORY: Denies any cough, hemoptysis, shortness of breath or dyspnea on exertion  GASTROINTESTINAL: As noted in the History of Present Illness  GENITOURINARY: No problems with urination  Denies any hematuria or dysuria  NEUROLOGIC:  Positive for dizziness without vertigo positive for headaches  MUSCULOSKELETAL: Denies any muscle or joint pain  SKIN: Denies skin rashes or itching  ENDOCRINE: Denies excessive thirst  Denies intolerance to heat or cold  PSYCHOSOCIAL:  Positive for anxious feelings  Denies any recent memory loss

## 2021-09-01 NOTE — LETTER
September 2, 2021     Lili Murphy, [address] [address]    Patient: Gerardo Shipman   YOB: 1980   Date of Visit: 9/1/2021       Dear Dr Aunpam Kendrick:    Thank you for referring Gerardo Shipman to me for evaluation  Below are my notes for this consultation  If you have questions, please do not hesitate to call me  I look forward to following your patient along with you  Sincerely,        Yousif Paiz MD        CC: No Recipients  Yousif Paiz MD  9/2/2021  9:34 AM  Sign when Signing Visit    [facility] - Outpatient Consultation  Gerardo Shipman 37 y o  female MRN: [id number]  Encounter: [id number]    ASSESSMENT AND PLAN:      1  Epigastric pain  --With a 6 month history of epigastric abdominal pain with radiation to the back  Partial relief with Pepcid  Some associated nausea  Has had weight loss as well  Previous intolerance and allergy to Prilosec  Differential diagnosis could include peptic ulcer disease however ulcers or typically healed within several weeks of treatment with famotidine, gastritis, H pylori infection, in the differential diagnosis  Functional abdominal pain is a possibility  Pancreatic disease could be considered as well given the location    -- proceed with EGD at [address] abdomen complete; Future  - Amylase; Future  - Lipase; Future    2  Weight loss, unintentional  -- related to problem 1   - C-reactive protein; Future    3  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  --- patient with significant environmental exposure fungal toxins in recently purchased house  Patient is moved out from that the [place] as it undergoes remain ED age    3  Personal history of allergy to medicinal agent   previous history of reaction with rash to Prilosec    5 Personal history of colon polyps  - patient reports more loose stools in the last year so  No blood per rectum    Does report having had a colonoscopy about about 10 years ago and was told that she had colon polyps and that she should come back in 5 years  -has some associated change in bowel habit in the past year    --Colonnoscopy at [facility]    Followup Appointment:  3 mo  ______________________________________________________________________    Chief Complaint   Patient presents with    Abdominal Pain       HPI:   Sigifredo Brennan is a 37y o  year old female who presents  For evaluation of ongoing problems with abdominal pain  Patient reports since the spring of this year having fairly significant epigastric abdominal pain with radiation to the back  This is sometimes aggravated by eating  She does have some associated nausea  Patient was actually seen in emergency department at [facility] in the spring  Lab work in abdominal x-ray with performed  Patient was advised that she may have peptic ulcer disease or gastritis  He has had significant weight loss upwards of 35 lb because of trouble with eating  She has not had vomiting  There is no use of nonsteroidal anti-inflammatories  The patient did have an upper endoscopy in the remote past there is no known history of peptic ulcer disease  Over the last couple months the patient has been taking famotidine twice a day  She seems to feel that this has helped the discomfort  Pain remains continuous there and there most of the time  Patient has been under treatment for exposure to environmental toxins  Patient and her  purchased a new home in the spring of 2020  Patient began having more health problems subsequently and has tested positive for allergy to fungal spores and mycotoxins related to the patient's new home  May have been an apartment over the last several months while the home undergoes room ED a shin  She is receiving therapy for that by her physician but did have some side effects with the treatment      She had a course of steroids for her allergic reactions  Patient does report in the remote past she had been placed on omeprazole  Patient had allergic reaction from that medication with significant rash  She actually was  challenged again with the medication and the rash resulted  once more  Patient does report some change in her bowel habit  Specifically she reports having some loose stool  This was not the case about a year ago  There is no family history of colorectal cancer  She has not had any rectal bleeding  She informs me however that she had a colonoscopy about 10 years ago  Despite being quite young she reports having had a history of colon polyps  She was advised to follow up with a colonoscopy 5 years subsequently but this was not done  Historical Information   Past Medical History:   Diagnosis Date    GERD (gastroesophageal reflux disease)      Past Surgical History:   Procedure Laterality Date    BREAST SURGERY      CHOLECYSTECTOMY       Social History     Substance and Sexual Activity   Alcohol Use Never     Social History     Substance and Sexual Activity   Drug Use Never     Social History     Tobacco Use   Smoking Status Never Smoker   Smokeless Tobacco Never Used     Family History   Problem Relation Age of Onset    Brain cancer Mother     Stomach cancer Father     Autoimmune disease Father     No Known Problems Sister     No Known Problems Brother     No Known Problems Sister     No Known Problems Brother     Colon polyps Neg Hx     Colon cancer Neg Hx        Meds/Allergies     Current Outpatient Medications:     famotidine (PEPCID) 20 mg tablet    acetaminophen (TYLENOL) 500 mg tablet    Allergies   Allergen Reactions    Diclegis [Doxylamine-Pyridoxine] Hives    Doxycycline Hives    Nyamyc [Nystatin] GI Intolerance    Prilosec [Omeprazole] Hives       PHYSICAL EXAM:    Blood pressure 118/70, height 5' 6" (1 676 m), weight 64 1 kg (141 lb 6 4 oz)  Body mass index is 22 82 kg/m²    General Appearance: NAD, cooperative, alert- thin  Eyes: Anicteric,  Conjunctiva pink  ENT:  Normocephalic, atraumatic, normal mucosa  Neck:  Supple, symmetrical, trachea midline,   Resp:  Clear to auscultation bilaterally; no rales, rhonchi or wheezing; respirations unlabored   CV:  S1 S2, Regular rate and rhythm; no murmur, rub, or gallop  GI:  Soft,  Very mild epigastric abdominal tenderness no guarding or rebound, non-distended; normal bowel sounds; no masses, no organomegaly   Rectal: Deferred  Musculoskeletal: No cyanosis, clubbing or edema  Normal ROM  Skin:  No jaundice, rashes, or lesions   Heme/Lymph: No palpable cervical lymphadenopathy  Psych: Normal affect, good eye contact  Neuro: No gross deficits, AAOx3    Lab Results:   Lab Results   Component Value Date    WBC 8 12 03/20/2021    HGB 14 6 03/20/2021    HCT 44 9 03/20/2021    MCV 91 03/20/2021     03/20/2021     Lab Results   Component Value Date    K 3 8 03/20/2021     03/20/2021    CO2 28 03/20/2021    BUN 11 03/20/2021    CREATININE 0 95 03/20/2021    CALCIUM 8 9 03/20/2021    AST 17 03/20/2021    ALT 30 03/20/2021    ALKPHOS 45 (L) 03/20/2021    EGFR 76 03/20/2021           REVIEW OF SYSTEMS:    CONSTITUTIONAL: Denies any fever, chills, rigors, positive for involuntary weight loss  HEENT: No earache or tinnitus  Positive for I congestion  CARDIOVASCULAR:  Positive for palpitations  RESPIRATORY: Denies any cough, hemoptysis, shortness of breath or dyspnea on exertion  GASTROINTESTINAL: As noted in the History of Present Illness  GENITOURINARY: No problems with urination  Denies any hematuria or dysuria  NEUROLOGIC:  Positive for dizziness without vertigo positive for headaches  MUSCULOSKELETAL: Denies any muscle or joint pain  SKIN: Denies skin rashes or itching  ENDOCRINE: Denies excessive thirst  Denies intolerance to heat or cold  PSYCHOSOCIAL:  Positive for anxious feelings  Denies any recent memory loss

## 2021-09-20 ENCOUNTER — ANESTHESIA EVENT (OUTPATIENT)
Dept: GASTROENTEROLOGY | Facility: AMBULATORY SURGERY CENTER | Age: 41
End: 2021-09-20

## 2021-09-20 NOTE — ANESTHESIA PREPROCEDURE EVALUATION
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  Egg allergy (able to tolerate egg whites)         Physical Exam    Airway    Mallampati score: II  TM Distance: >3 FB  Neck ROM: full     Dental   No notable dental hx     Cardiovascular      Pulmonary      Other Findings        Anesthesia Plan  ASA Score- 2     Anesthesia Type- IV sedation with anesthesia with ASA Monitors  Additional Monitors:   Airway Plan:           Plan Factors-    Chart reviewed  Patient summary reviewed  Patient is not a current smoker  Patient did not smoke on day of surgery  Induction- intravenous  Postoperative Plan-     Informed Consent- Anesthetic plan and risks discussed with patient  I personally reviewed this patient with the CRNA  Discussed and agreed on the Anesthesia Plan with the CRNA  Nickolas Arriaga

## 2021-09-21 ENCOUNTER — HOSPITAL ENCOUNTER (OUTPATIENT)
Dept: GASTROENTEROLOGY | Facility: AMBULATORY SURGERY CENTER | Age: 41
Discharge: HOME | End: 2021-09-21
Payer: COMMERCIAL

## 2021-09-21 ENCOUNTER — ANESTHESIA (OUTPATIENT)
Dept: GASTROENTEROLOGY | Facility: AMBULATORY SURGERY CENTER | Age: 41
End: 2021-09-21

## 2021-09-21 VITALS
DIASTOLIC BLOOD PRESSURE: 63 MMHG | HEART RATE: 85 BPM | TEMPERATURE: 99.3 F | SYSTOLIC BLOOD PRESSURE: 108 MMHG | OXYGEN SATURATION: 100 % | RESPIRATION RATE: 15 BRPM

## 2021-09-21 DIAGNOSIS — Z86.010 HISTORY OF COLON POLYPS: ICD-10-CM

## 2021-09-21 DIAGNOSIS — R63.4 WEIGHT LOSS, UNINTENTIONAL: ICD-10-CM

## 2021-09-21 DIAGNOSIS — R10.13 EPIGASTRIC PAIN: ICD-10-CM

## 2021-09-21 LAB
EXT PREGNANCY TEST URINE: NEGATIVE
EXT. CONTROL: NORMAL

## 2021-09-21 PROCEDURE — 88313 SPECIAL STAINS GROUP 2: CPT | Performed by: PATHOLOGY

## 2021-09-21 PROCEDURE — 88341 IMHCHEM/IMCYTCHM EA ADD ANTB: CPT | Performed by: PATHOLOGY

## 2021-09-21 PROCEDURE — 43239 EGD BIOPSY SINGLE/MULTIPLE: CPT | Performed by: INTERNAL MEDICINE

## 2021-09-21 PROCEDURE — 88305 TISSUE EXAM BY PATHOLOGIST: CPT | Performed by: PATHOLOGY

## 2021-09-21 PROCEDURE — 45380 COLONOSCOPY AND BIOPSY: CPT | Performed by: INTERNAL MEDICINE

## 2021-09-21 PROCEDURE — 88342 IMHCHEM/IMCYTCHM 1ST ANTB: CPT | Performed by: PATHOLOGY

## 2021-09-21 RX ORDER — LIDOCAINE HYDROCHLORIDE 10 MG/ML
INJECTION, SOLUTION EPIDURAL; INFILTRATION; INTRACAUDAL; PERINEURAL AS NEEDED
Status: DISCONTINUED | OUTPATIENT
Start: 2021-09-21 | End: 2021-09-21

## 2021-09-21 RX ORDER — SODIUM CHLORIDE, SODIUM LACTATE, POTASSIUM CHLORIDE, CALCIUM CHLORIDE 600; 310; 30; 20 MG/100ML; MG/100ML; MG/100ML; MG/100ML
125 INJECTION, SOLUTION INTRAVENOUS CONTINUOUS
Status: DISCONTINUED | OUTPATIENT
Start: 2021-09-21 | End: 2021-09-21

## 2021-09-21 RX ORDER — PROPOFOL 10 MG/ML
INJECTION, EMULSION INTRAVENOUS AS NEEDED
Status: DISCONTINUED | OUTPATIENT
Start: 2021-09-21 | End: 2021-09-21

## 2021-09-21 RX ORDER — BUPROPION HYDROCHLORIDE 75 MG/1
TABLET ORAL
COMMUNITY
Start: 2021-09-07

## 2021-09-21 RX ADMIN — PROPOFOL 150 MG: 10 INJECTION, EMULSION INTRAVENOUS at 13:13

## 2021-09-21 RX ADMIN — SODIUM CHLORIDE, SODIUM LACTATE, POTASSIUM CHLORIDE, CALCIUM CHLORIDE: 600; 310; 30; 20 INJECTION, SOLUTION INTRAVENOUS at 13:43

## 2021-09-21 RX ADMIN — PROPOFOL 50 MG: 10 INJECTION, EMULSION INTRAVENOUS at 13:25

## 2021-09-21 RX ADMIN — PROPOFOL 50 MG: 10 INJECTION, EMULSION INTRAVENOUS at 13:20

## 2021-09-21 RX ADMIN — PROPOFOL 50 MG: 10 INJECTION, EMULSION INTRAVENOUS at 13:15

## 2021-09-21 RX ADMIN — PROPOFOL 50 MG: 10 INJECTION, EMULSION INTRAVENOUS at 13:37

## 2021-09-21 RX ADMIN — PROPOFOL 50 MG: 10 INJECTION, EMULSION INTRAVENOUS at 12:38

## 2021-09-21 RX ADMIN — PROPOFOL 50 MG: 10 INJECTION, EMULSION INTRAVENOUS at 13:31

## 2021-09-21 RX ADMIN — SODIUM CHLORIDE, SODIUM LACTATE, POTASSIUM CHLORIDE, CALCIUM CHLORIDE 125 ML/HR: 600; 310; 30; 20 INJECTION, SOLUTION INTRAVENOUS at 13:10

## 2021-09-21 RX ADMIN — PROPOFOL 30 MG: 10 INJECTION, EMULSION INTRAVENOUS at 13:43

## 2021-09-21 RX ADMIN — PROPOFOL 50 MG: 10 INJECTION, EMULSION INTRAVENOUS at 12:46

## 2021-09-21 RX ADMIN — PROPOFOL 50 MG: 10 INJECTION, EMULSION INTRAVENOUS at 13:34

## 2021-09-21 RX ADMIN — LIDOCAINE HYDROCHLORIDE 50 MG: 10 INJECTION, SOLUTION EPIDURAL; INFILTRATION; INTRACAUDAL; PERINEURAL at 13:13

## 2021-09-21 NOTE — ANESTHESIA POSTPROCEDURE EVALUATION
Post-Op Assessment Note    No complications documented      BP   110/55   Temp      Pulse  100   Resp   16   SpO2   100

## 2021-09-21 NOTE — DISCHARGE INSTRUCTIONS
Celiac Disease   WHAT YOU NEED TO KNOW:   What is celiac disease? Celiac disease is a long-term condition that affects your small intestine  Your immune system reacts to the protein gluten in food and damages your small intestine  You may not be able to absorb vitamins, minerals, and other nutrients from the foods you eat  What increases my risk of celiac disease? The cause of celiac disease is not known  You are at higher risk if you have another autoimmune disease, such as rheumatoid arthritis, or a family member with celiac disease  What are the signs and symptoms of celiac disease? The most common symptom of celiac disease is diarrhea that may smell bad or look oily  You may also have the following:  · Stomach pain, bloating, gas, and weight loss    · Weakness, low energy, and loss of appetite    · Bone pain or osteoporosis (bone loss)    · Missed monthly periods or difficulty getting pregnant    · Numbness or tingling in your legs and muscle cramps    · Mouth sores or a skin rash that itches    How is celiac disease diagnosed? · Blood tests  are used to check for antibodies to gluten  They may also be used to check for anemia and other deficiencies caused by celiac disease  · A sample of your bowel movement  is tested to see if you are absorbing nutrients from your diet  For this test, you will need to eat a high-fat diet for 1 day  Then you will need to collect your bowel movements for 2 days  The samples will be sent to a lab  · Small bowel barium x-rays  are pictures of your abdomen  You will need to swallow a thick liquid called barium  Barium helps the intestines show up better on the x-ray  · An endoscopic tissue biopsy  is a procedure to look at the inside of your small intestine  An endoscope is a bendable tube with a light and camera on the end  Healthcare providers may remove a small amount of tissue for a biopsy  How is celiac disease treated?   Since there is no cure for celiac disease, the goal of treatment is to reduce the symptoms  It may take up to 6 months or longer for your intestines to function better  You may need medicine such as steroids to suppress your immune system and decrease inflammation  How do I manage celiac disease? · Do not eat food that contains gluten  This is the most important way to manage your symptoms  Do not eat anything made with wheat, rye, barley, or oats  Gluten is found in additives in many packaged and restaurant foods  Read food labels or ask before you order food  A dietitian can help you plan meals that do not contain gluten  · Ask about supplements  You may need to take supplements that contain iron, folic acid, vitamin J03, calcium, or vitamin D  These may be given as a pill or through an IV  When should I contact my healthcare provider? · You have new symptoms or your symptoms get worse  · You have questions or concerns about your condition or care  When should I seek immediate care or call 911? · You have a fever  · You have severe abdominal pain  · You have blood in your bowel movement  CARE AGREEMENT:   You have the right to help plan your care  Learn about your health condition and how it may be treated  Discuss treatment options with your healthcare providers to decide what care you want to receive  You always have the right to refuse treatment  The above information is an  only  It is not intended as medical advice for individual conditions or treatments  Talk to your doctor, nurse or pharmacist before following any medical regimen to see if it is safe and effective for you  © Copyright [employer] 2021 Information is for End User's use only and may not be sold, redistributed or otherwise used for commercial purposes   All illustrations and images included in CareNotes® are the copyrighted property of A D A M [employer] Inc  or [employer] [facility]  Gastric Polyps   WHAT YOU NEED TO KNOW:   What are gastric polyps? Gastric polyps are growths that form in the lining of your stomach  They are not cancerous, but certain types of polyps can change into cancer  What puts me at risk for gastric polyps? · Chronic gastritis caused by NSAIDs use or ulcers    · Long-term use of proton pump inhibitor medicines (used to decrease stomach acid)    · An infection in your stomach caused by H  pylori bacteria    What are the symptoms of gastric polyps? You may have no symptoms  Large polyps may cause any of the following:  · Abdominal pain    · Indigestion    · Vomiting after meals or vomiting blood    · Dark or bloody bowel movements    How are gastric polyps diagnosed? Gastric polyps are usually found during an endoscopy for another reason  All or part of the polyp will be removed during the test  Your healthcare provider may also remove tissue from your stomach  The polyps and tissue are sent to the lab for testing  How are gastric polyps treated? Some types of polyps go away on their own  Other types may be removed if they are large, you have symptoms, or abnormal cells are found  Large polyps and abnormal cells increase your risk for cancer  You may also need antibiotics if you have an infection caused by H  pylori bacteria  Part of your stomach may be removed if the polyps cannot be removed and abnormal cells are found  When should I seek immediate care? · You have blood in your vomit  · You have dark or bloody bowel movements  · You have severe pain in your abdomen that does not go away after you take medicine  When should I contact my healthcare provider? · You have indigestion that does not go away with treatment  · You vomit after meals  · You have questions or concerns about your condition or care  CARE AGREEMENT:   You have the right to help plan your care  Learn about your health condition and how it may be treated   Discuss treatment options with your healthcare providers to decide what care you want to receive  You always have the right to refuse treatment  The above information is an  only  It is not intended as medical advice for individual conditions or treatments  Talk to your doctor, nurse or pharmacist before following any medical regimen to see if it is safe and effective for you  © Copyright [employer] 2021 Information is for End User's use only and may not be sold, redistributed or otherwise used for commercial purposes  All illustrations and images included in CareNotes® are the copyrighted property of [employer] M [employer]  or [employer]  Gastritis   WHAT YOU NEED TO KNOW:   What is gastritis? Gastritis is inflammation or irritation of the lining of your stomach  What increases my risk for gastritis? · Infection with bacteria, a virus, or a parasite    · NSAIDs, aspirin, or steroid medicine    · Use of tobacco products or alcohol    · Trauma such as an injury to your stomach or intestine    · Autoimmune disorders such as diabetes, thyroid disease, or Crohn disease    · Stress    · Age older than 60 years    · Illegal drugs, such as cocaine    What are the signs and symptoms of gastritis? · Stomach pain, burning, or tenderness when you press on it    · Stomach fullness or tightness    · Nausea or vomiting    · Loss of appetite, or feeling full quickly when you eat    · Bad breath    · Fatigue or feeling more tired than usual    · Heartburn    How is gastritis diagnosed? Your healthcare provider will ask about your signs and symptoms and examine you  You may need any of the following:  · Blood tests  may be used to show an infection, dehydration, or anemia (low red blood cell levels)  · A bowel movement sample  may be tested for blood or the germ that may be causing your gastritis  · A breath test  may show if H pylori is causing your gastritis  You will be given a liquid to drink  Then you will breathe into a bag   Your healthcare provider will measure the amount of carbon dioxide in your breath  Extra amounts of carbon dioxide may mean you have an H pylori infection  · An endoscopy  may be used to look for irritation or bleeding in your stomach  Your healthcare provider will use an endoscope (tube with a light and camera on the end) during the procedure  He or she may take a sample from your stomach to be tested  How is gastritis treated? Your symptoms may go away without treatment  Treatment will depend on what is causing your gastritis  Your healthcare provider may recommend changes to the medicines you take  Medicines may be given to help treat a bacterial infection or decrease stomach acid  How can I manage or prevent gastritis? · Do not smoke  Nicotine and other chemicals in cigarettes and cigars can make your symptoms worse and cause lung damage  Ask your healthcare provider for information if you currently smoke and need help to quit  E-cigarettes or smokeless tobacco still contain nicotine  Talk to your healthcare provider before you use these products  · Do not drink alcohol  Alcohol can prevent healing and make your gastritis worse  Talk to your healthcare provider if you need help to stop drinking  · Do not take NSAIDs or aspirin unless directed  These and similar medicines can cause irritation of your stomach lining  If your healthcare provider says it is okay to take NSAIDs, take them with food  · Do not eat foods that cause irritation  Foods such as oranges and salsa can cause burning or pain  Eat a variety of healthy foods  Examples include fruits (not citrus), vegetables, low-fat dairy products, beans, whole-grain breads, and lean meats and fish  Try to eat small meals, and drink water with your meals  Do not eat for at least 3 hours before you go to bed  · Find ways to relax and decrease stress  Stress can increase stomach acid and make gastritis worse   Activities such as yoga, meditation, or listening to music can help you relax  Spend time with friends, or do things you enjoy  Call 911 for any of the following:   · You develop chest pain or shortness of breath  When should I seek immediate care? · You vomit blood  · You have black or bloody bowel movements  · You have severe stomach or back pain  When should I contact my healthcare provider? · You have a fever  · You have new or worsening symptoms, even after treatment  · You have questions or concerns about your condition or care  CARE AGREEMENT:   You have the right to help plan your care  Learn about your health condition and how it may be treated  Discuss treatment options with your healthcare providers to decide what care you want to receive  You always have the right to refuse treatment  The above information is an  only  It is not intended as medical advice for individual conditions or treatments  Talk to your doctor, nurse or pharmacist before following any medical regimen to see if it is safe and effective for you  © Copyright [employer] 2021 Information is for End User's use only and may not be sold, redistributed or otherwise used for commercial purposes  All illustrations and images included in CareNotes® are the copyrighted property of [employer]  or [employer]  Helicobacter Pylori   WHAT YOU NEED TO KNOW:   What is a Helicobacter pylori (H  pylori) infection? H  pylori bacteria infect the lining of the stomach and upper intestine  People are usually infected with the bacteria as children but do not have symptoms until they are adults  What are the signs and symptoms of an H  pylori infection? Most people who are infected with H  pylori never have symptoms  If you do, your symptoms may come and go and last for minutes or hours  You may feel better for a short time after you eat food or take medicine   You may have any of the following:  · Dull or burning pain in your stomach or throat    · Nausea, vomiting, bloating, or burping    · Loss of appetite or weight loss    · Pain at night or with an empty stomach    How is an H  pylori infection diagnosed? · A urea breath test  means you will swallow pudding, liquid, or a capsule that contains a chemical  Then you will breathe into a container  Your breath sample will be tested for a reaction to the chemical that confirms H  pylori infection  · Bowel movement or blood samples  may be tested for infection  · Endoscopy  is a procedure that may be done if your healthcare provider thinks you have an ulcer  He or she will use a scope to see the inside of your stomach  A scope is a soft, flexible tube with a light and tiny camera on the end  It is passed down your throat and into your stomach  Samples of your stomach tissue may be removed and tested for H  pylori infection  How is an H  pylori infection treated? It is important to treat the infection  H  pylori may lead to changes in the cells of your esophagus or stomach  The cells are changed into intestine cells  This is a condition called intestinal metaplasia that increases your risk for cancer of the esophagus or stomach  The following may be used to treat an infection:  · Antibiotics  help kill the bacteria  You may need to take this medicine for 10 to 14 days  Your healthcare provider will prescribe at least 2 antibiotics at the same time  · Antiulcer medicines  help decrease the amount of acid that is normally made by the stomach  These help relieve pain and heal or prevent ulcers  · Bismuth  is a liquid or tablet that may be used to decrease heartburn, upset stomach, or diarrhea  It may also decrease swelling in your stomach and help kill the infection if other medicines do not work  It also protects ulcers from stomach acid so they can heal     How can I prevent or manage an H  pylori infection? · Wash your hands often    Infection can happen through contact with infected bowel movement, vomit, or saliva  Use soap and warm water  Use an alcohol-based gel if soap and water are not available  Clean your hands before you eat and after you use the bathroom  Clean your hands after you change a baby's diaper  · Handle food properly  Infection can happen if you drink water that is not clean or eat food that is not washed or cooked properly  Rinse food well before you cook or eat it  [employer] food all the way through  Proper handling will help kill any bacteria that may be on your food  · Drink clean water from a safe source  Only drink water that has been filtered or purified  · Ask about NSAIDs  NSAIDs can cause stomach bleeding and kidney problems if not taken correctly  Your healthcare provider may tell you to avoid these medicines because they can make your symptoms worse  · Do not smoke  Nicotine and other chemicals in cigarettes and cigars can worsen your symptoms  Ask your provider for information if you currently smoke and need help to quit  E-cigarettes or smokeless tobacco still contain nicotine  Talk to your provider before you use these products  · Do not drink alcohol  Alcohol may worsen your symptoms of heartburn  Alcohol also increases the risk for cancer of the esophagus or stomach  Ask your provider for information if you currently drink alcohol and need help to quit  When should I seek immediate care? · You have bloody bowel movements, bloody vomit, or vomit that looks like coffee grounds  · You have sudden, sharp stomach pain that does not go away or spreads to your back  When should I call my doctor? · Your symptoms do not improve with treatment  · You feel full after eating only a small amount of food  · You lose weight without trying  · You have questions or concerns about your condition or care  CARE AGREEMENT:   You have the right to help plan your care  Learn about your health condition and how it may be treated   Discuss treatment options with your healthcare providers to decide what care you want to receive  You always have the right to refuse treatment  The above information is an  only  It is not intended as medical advice for individual conditions or treatments  Talk to your doctor, nurse or pharmacist before following any medical regimen to see if it is safe and effective for you  © Copyright [employer] 2021 Information is for End User's use only and may not be sold, redistributed or otherwise used for commercial purposes  All illustrations and images included in CareNotes® are the copyrighted property of A D A M [employer] Inc  or [employer] [facility]  Microscopic Colitis   WHAT YOU NEED TO KNOW:   What is microscopic colitis? Microscopic colitis is long-term inflammation of your colon (large intestine)  Inflammation can damage the lining of your colon and cause long-term diarrhea  Microscopic colitis may be caused by an infection, higher levels of acid in your colon, or the cause may not be known  What increases my risk for microscopic colitis? · Older age    · Being female    · Increased bile acids    · Family history of microscopic colitis or another colon disease    · Bacterial infection    · Medicines for pain, depression, ulcers, or seizures    · Weakened immune system    What are the signs and symptoms of microscopic colitis? · Diarrhea    · Abdominal pain    · Fatigue     · Nausea or vomiting    · Weight loss    · Loss of bowel movement control    · Dehydration (thirst, dry mouth, or decreased urine)    How is microscopic colitis diagnosed? · Blood tests  may show if you have an infection  · A bowel movement sample  may show what germ is causing your illness  · A colonoscopy  may show what is causing your colitis  A tube with a light and camera on the end will be put into your anus, and moved forward into your intestine  How is microscopic colitis treated?   Medicines may be given to treat a bacterial infection, decrease inflammation in your colon, or treat diarrhea  You may also need medicine to decrease acid levels in your colon that could cause irritation  How can I manage my symptoms? · Eat a variety of healthy foods  Healthy foods include fruits, vegetables, whole-grain breads, low-fat dairy products, beans, lean meats, and fish  You may need to eat several small meals throughout the day  Avoid spicy foods, caffeine, chocolate, and foods high in fat  · Drink liquids as directed  to help prevent dehydration  Good liquids to drink include water, juice, and broth  Ask how much liquid to drink each day  You may need to drink an oral rehydration solution (ORS)  An ORS contains a balance of water, salt, and sugar to replace body fluids lost during diarrhea  · Start to exercise when you feel better  Regular exercise helps your bowels work normally  Ask about the best exercise plan for you  · Ask about probiotics  You may need supplements that help balance the bacteria in your colon  This may help decrease you symptoms  How can I help prevent microscopic colitis? · Wash your hands  Wash your hands in warm, soapy water for 20 seconds before and after you handle food  Wash your hands after you use the bathroom, change a diaper, or touch an animal      · Clean food and areas that come in contact with food  Rinse fruits and vegetables in running water  Clean cutting boards, knives, countertops, and other areas where you prepare food before and after you cook  Wash sponges and dishtowels weekly in hot water  · [employer] food all the way through  Cook eggs until the yolks are firm  Use a meat thermometer to make sure meat is heated to a temperature that will kill bacteria  Do not eat raw or undercooked chicken, turkey, seafood, or meat  · Store food properly  Refrigerate or freeze fruits and vegetables, cooked foods, and leftovers  · Drink safe water  Drink only treated water  Do not drink water from ponds or lakes, or from swimming pools that do not contain chlorine  Drink bottled water when traveling  Call 911 for any of the following:   · You have trouble breathing  When should I seek immediate care? · You have any of the following signs of severe dehydration:     ? Dizziness or weakness    ? Dry mouth, cracked lips, or severe thirst    ? Fast heartbeat or breathing    ? Passing little to no urine    · You have black or bright red stools  · You have blood in your vomit  · Your abdomen feels larger than normal, hard, and painful  When should I contact my healthcare provider? · You have a fever with abdominal pain that does not go away  · You have a fever, chills, cough, or feel weak and achy  · Your diarrhea gets worse  · Your symptoms do not improve or get worse  · You are losing weight without trying  · You have questions or concerns about your condition or care  CARE AGREEMENT:   You have the right to help plan your care  Learn about your health condition and how it may be treated  Discuss treatment options with your healthcare providers to decide what care you want to receive  You always have the right to refuse treatment  The above information is an  only  It is not intended as medical advice for individual conditions or treatments  Talk to your doctor, nurse or pharmacist before following any medical regimen to see if it is safe and effective for you  © Copyright [employer] 2021 Information is for End User's use only and may not be sold, redistributed or otherwise used for commercial purposes  All illustrations and images included in CareNotes® are the copyrighted property of [employer] M [employer] Inc  or [facility]   Colorectal Polyps   WHAT YOU NEED TO KNOW:   What are colorectal polyps? Colorectal polyps are small growths of tissue in the lining of the colon and rectum  Most polyps are usually benign (not cancer)   Certain types of polyps, called adenomatous polyps, may turn into cancer  What increases my risk for colorectal polyps? The exact cause of colorectal polyps is unknown  The following may increase your risk:  · Older age    · Foods high in fat and low in fiber    · Family history of polyps    · Intestinal diseases, such as Crohn disease or ulcerative colitis    · Smoking cigarettes or drinking alcohol    · Lack of physical activity, such as exercise    · Obesity    What are the signs and symptoms of colorectal polyps? · Blood in your bowel movement or bleeding from the rectum    · Change in bowel movement habits, such as diarrhea or constipation    · Abdominal pain    How are colorectal polyps diagnosed? You should have fecal blood screening 1 time each year for colorectal disease if you are older than 50 years  You should be screened earlier if you have an intestinal disease or a family history of polyps or colorectal cancer  During this screening, a sample of your bowel movement is checked for blood, which may be an early sign of colorectal polyps or cancer  You may also need any of the following tests:  · A digital rectal exam  means your healthcare provider will use a finger to check for polyps  · A barium enema  is an x-ray of the colon  A tube is put into your anus, and a liquid called barium is put through the tube  Barium is used so that healthcare providers can see your colon better on the x-ray film  · A virtual colonoscopy  is a CT scan that takes pictures of the inside of your colon and rectum  A small, flexible tube is put into your rectum and air or carbon dioxide (gas) is used to expand your colon  This lets healthcare providers clearly see your colon and any polyps on a monitor  · Colonoscopy or sigmoidoscopy  are procedures to help your healthcare provider see the inside of your colon  He or she will use a flexible tube with a small light and camera on the end   During a sigmoidoscopy, your healthcare provider will only look at rectum and lower colon  During a colonoscopy, he or she will look at the full length of your colon  A small amount of tissue may be removed from your colon for tests  How are colorectal polyps treated? Small, benign polyps may not need treatment  Your healthcare provider will check the polyp over time to make sure it is not changing  Polyps that are cancer may be removed with one of the following:  · A polypectomy  is a minimally invasive procedure to remove a polyp during a colonoscopy or sigmoidoscopy  Your healthcare provider may need to remove the polyp with a laparoscope  Laparoscopy is done by inserting a small, flexible scope into incisions made on your abdomen  · Surgery  may be needed to remove large or deep polyps that cannot be removed in a polypectomy  Tissues or lymph nodes near a polyp may also be removed  This helps stop cancer from spreading  What can I do to lower my risk for colorectal polyps? · Eat a variety of healthy foods  Healthy foods include fruit, vegetables, whole-grain breads, low-fat dairy products, beans, lean meat, and fish  Ask if you need to be on a special diet  · Maintain a healthy weight  Ask your healthcare provider what a healthy weight is for you  Ask him or her to help with a weight loss plan if you are overweight  · Exercise as directed  Begin to exercise slowly and do more as you get stronger  Talk with your healthcare provider before you start an exercise program          · Limit alcohol  Your risk for polyps increases the more you drink  · Do not smoke  Nicotine and other chemicals in cigarettes and cigars increase your risk for polyps  Ask your healthcare provider for information if you currently smoke and need help to quit  E-cigarettes or smokeless tobacco still contain nicotine  Talk to your healthcare provider before you use these products  Where can I find more information?    · National Digestive Diseases Information Clearinghouse (NDDIC)  [address] , [place] [address]  Phone: 2- 316 - 013-4883  Web Address: www digestive  [facility]    Call your local emergency number (911 in the [address]) if:   · You have sudden shortness of breath  · You have a fast heart rate, fast breathing, or are too dizzy to stand up  When should I seek immediate care? · You have severe abdominal pain  · You see blood in your bowel movement  When should I call my doctor? · You have a fever  · You have chills, a cough, or feel weak and achy  · You have abdominal pain that does not go away or gets worse after you take medicine  · Your abdomen is swollen  · You are losing weight without trying  · You have questions or concerns about your condition or care  CARE AGREEMENT:   You have the right to help plan your care  Learn about your health condition and how it may be treated  Discuss treatment options with your healthcare providers to decide what care you want to receive  You always have the right to refuse treatment  The above information is an  only  It is not intended as medical advice for individual conditions or treatments  Talk to your doctor, nurse or pharmacist before following any medical regimen to see if it is safe and effective for you  © Copyright [employer] 2021 Information is for End User's use only and may not be sold, redistributed or otherwise used for commercial purposes  All illustrations and images included in CareNotes® are the copyrighted property of A D A M , Inc  or [facility] Jose Wilhelm   Hemorrhoids   WHAT YOU NEED TO KNOW:   What are hemorrhoids? Hemorrhoids are swollen blood vessels inside your rectum (internal hemorrhoids) or on your anus (external hemorrhoids)  Sometimes a hemorrhoid may prolapse  This means it extends out of your anus  What increases my risk for hemorrhoids?    · Pregnancy or obesity    · Straining or sitting for a long time during bowel movements    · Liver disease    · Weak muscles around the anus caused by older age, rectal surgery, or anal intercourse    · A lack of physical activity    · Chronic diarrhea or constipation    · A low-fiber diet    What are the signs and symptoms of hemorrhoids? · Pain or itching around your anus or inside your rectum    · Swelling or bumps around your anus    · Bright red blood in your bowel movement, on the toilet paper, or in the toilet bowl    · Tissue bulging out of your anus (prolapsed hemorrhoids)    · Incontinence (poor control over urine or bowel movements)    How are hemorrhoids diagnosed? Your healthcare provider will ask about your symptoms, the foods you eat, and your bowel movements  He or she will examine your anus for external hemorrhoids  You may need the following:  · A digital rectal exam  is a test to check for hemorrhoids  Your healthcare provider will put a gloved finger inside your anus to feel for the hemorrhoids  · An anoscopy  is a test that uses a scope (small tube with a light and camera on the end) to look at your hemorrhoids  How are hemorrhoids treated? Treatment will depend on your symptoms  You may need any of the following:  · Medicines  can help decrease pain and swelling, and soften your bowel movement  The medicine may be a pill, pad, cream, or ointment  · Procedures  may be used to shrink or remove your hemorrhoid  Examples include rubber-band ligation, sclerotherapy, and photocoagulation  These procedures may be done in your healthcare provider's office  Ask your healthcare provider for more information about these procedures  · Surgery  may be needed to shrink or remove your hemorrhoids  How can I manage my symptoms? · Apply ice on your anus for 15 to 20 minutes every hour or as directed  Use an ice pack, or put crushed ice in a plastic bag  Cover it with a towel before you apply it to your anus   Ice helps prevent tissue damage and decreases swelling and pain  · Take a sitz bath  Fill a bathtub with 4 to 6 inches of warm water  You may also use a sitz bath pan that fits inside a toilet bowl  Sit in the sitz bath for 15 minutes  Do this 3 times a day, and after each bowel movement  The warm water can help decrease pain and swelling  · Keep your anal area clean  Gently wash the area with warm water daily  Soap may irritate the area  After a bowel movement, wipe with moist towelettes or wet toilet paper  Dry toilet paper can irritate the area  How can I help prevent hemorrhoids? · Do not strain to have a bowel movement  Do not sit on the toilet too long  These actions can increase pressure on the tissues in your rectum and anus  · Drink plenty of liquids  Liquids can help prevent constipation  Ask how much liquid to drink each day and which liquids are best for you  · Eat a variety of high-fiber foods  Examples include fruits, vegetables, and whole grains  Ask your healthcare provider how much fiber you need each day  You may need to take a fiber supplement  · Exercise as directed  Exercise, such as walking, may make it easier to have a bowel movement  Ask your healthcare provider to help you create an exercise plan  · Do not have anal sex  Anal sex can weaken the skin around your rectum and anus  · Avoid heavy lifting  This can cause straining and increase your risk for another hemorrhoid  When should I seek immediate care? · You have severe pain in your rectum or around your anus  · You have severe pain in your abdomen and you are vomiting  · You have bleeding from your anus that soaks through your underwear  When should I contact my healthcare provider? · You have frequent and painful bowel movements  · Your hemorrhoid looks or feels more swollen than usual      · You do not have a bowel movement for 2 days or more  · You see or feel tissue coming through your anus  · You have questions or concerns about your condition or care  CARE AGREEMENT:   You have the right to help plan your care  Learn about your health condition and how it may be treated  Discuss treatment options with your healthcare providers to decide what care you want to receive  You always have the right to refuse treatment  The above information is an  only  It is not intended as medical advice for individual conditions or treatments  Talk to your doctor, nurse or pharmacist before following any medical regimen to see if it is safe and effective for you  © Copyright [employer] 2021 Information is for End User's use only and may not be sold, redistributed or otherwise used for commercial purposes  All illustrations and images included in CareNotes® are the copyrighted property of A D A M , Inc  or [address] [place] [address] Melonie   Upper Endoscopy and Colonoscopy   WHAT YOU NEED TO KNOW:   An upper endoscopy is also called an upper gastrointestinal (GI) endoscopy, or an esophagogastroduodenoscopy (EGD)  It is a procedure to examine the inside of your esophagus, stomach, and duodenum (first part of the small intestine) with a scope  You may feel bloated, gassy, or have some abdominal discomfort after your procedure  Your throat may be sore for 24 to 36 hours  You may burp or pass gas from air that is still inside your body  A colonoscopy is a procedure to examine the inside of your colon (intestine) with a scope  Polyps or tissue growths may have been removed during your colonoscopy  It is normal to feel bloated and to have some abdominal discomfort  You should be passing gas  If you have hemorrhoids or you had polyps removed, you may have a small amount of bleeding  DISCHARGE INSTRUCTIONS:   Seek care immediately if:   · You have sudden, severe abdominal pain  · You have problems swallowing       · You have a large amount of black, sticky bowel movements or blood in your bowel movements  · You have sudden trouble breathing  · You feel weak, lightheaded, or faint or your heart beats faster than normal for you  Contact your healthcare provider if:   · You have a fever and chills  · You have nausea or are vomiting  · Your abdomen is bloated or feels full and hard  · You have abdominal pain  · You have a large amount of black, sticky bowel movements or blood in your bowel movements  · You have not had a bowel movement for 3 days after your procedure  · You have rash or hives  · You have questions or concerns about your procedure  Activity:   ·       Do not lift, strain, or run for 24 hours after your procedure  ·       Rest after your procedure  You have been given medicine to relax you  Do not drive or make important decisions until the day after your procedure  Return to your normal activity as directed  ·       Relieve gas and discomfort from bloating by lying on your right side with a heating pad on your abdomen  You may need to take short walks to help the gas move out  Eat small meals until bloating is relieved  Follow up with your healthcare provider as directed: Write down your questions so you remember to ask them during your visits  If you take a blood thinner, please review the specific instructions from your endoscopist about when you should resume it  These can be found in the Recommendation and Your Medication list sections of this After Visit Summary

## 2021-09-27 ENCOUNTER — TELEPHONE (OUTPATIENT)
Dept: GASTROENTEROLOGY | Facility: CLINIC | Age: 41
End: 2021-09-27

## 2021-09-27 NOTE — TELEPHONE ENCOUNTER
Tightness, squeezing in chest, has returned  Feels it most of the time  No n/v/fever  Some diarrhea  Feels like she is short of breath- like "I am being squished"  If she drinks coffee, or eats something with sacue, etc  It can get worse  On pepcid 20 mg BID since August     Is allergic to omeprazole  Patient is okay with waiting for KANIKA's response tomorrow

## 2021-09-27 NOTE — RESULT ENCOUNTER NOTE
Called the patient and reviewed results - gastric biopsy of submucosal lesion - some muscle tissue - checking if  patient may need EUS to further check - no h pylori or celiac -- colon - 1 serrated polyp - needs colonoscopy recall in 5 years   Left voice mail for the patient

## 2021-09-27 NOTE — TELEPHONE ENCOUNTER
"Called the patient and reviewed results - gastric biopsy of submucosal lesion - some muscle tissue - checking if  patient may need EUS to further check - no h pylori or celiac -- colon - 1 serrated polyp - needs colonoscopy recall in 5 years   Left voice mail for the patient "

## 2021-09-27 NOTE — TELEPHONE ENCOUNTER
Pt states she missed a call from Gene Mchugh this morn w/ bx results; was going to tell him that last wk after proc she started w/ discomfort after the proc and was going to talk to him when he called; reports intermittent pain under the center of her chest at rib cage that radiates to her back on Lf side  CB# [phone]  Pt aware Gene Mchugh not here

## 2021-09-28 ENCOUNTER — TELEPHONE (OUTPATIENT)
Dept: GASTROENTEROLOGY | Facility: CLINIC | Age: 41
End: 2021-09-28

## 2021-09-28 DIAGNOSIS — K21.9 GASTROESOPHAGEAL REFLUX DISEASE WITHOUT ESOPHAGITIS: Primary | ICD-10-CM

## 2021-09-28 RX ORDER — FAMOTIDINE 40 MG/1
40 TABLET, FILM COATED ORAL 2 TIMES DAILY
Qty: 60 TABLET | Refills: 5 | Status: SHIPPED | OUTPATIENT
Start: 2021-09-28 | End: 2021-12-22

## 2021-09-28 NOTE — TELEPHONE ENCOUNTER
Called the patient and reviewed symptoms  She seems to be little more with spicy foods her coffee  Some spasmodic type aspect of the pain  Seems to get worse as the day goes along  Will try famotidine 40 mg twice a day  Also discussed about her endoscopic biopsies  Was in touch with Dr Primitivo Johns- reasonable to put patient in for endoscopic ultrasound  Will arrange  Will also change her famotidine prescription

## 2021-09-28 NOTE — TELEPHONE ENCOUNTER
Did advise patient about her symptoms of squeezing the chest worse with spicy foods and coffee  Seems to have done fairly well Pepcid  Worse as the day goes along  Try famotidine 40 twice a day  If not effective will go with Bentyl 10 t i d  p r n  Ru Barnes Patient to see Dr Dr Maxwell Montanez per endoscopic ultrasound with respect to the submucosal antral lesion    Told the patient his office would likely get in touch with her

## 2021-10-04 ENCOUNTER — PREP FOR PROCEDURE (OUTPATIENT)
Dept: GASTROENTEROLOGY | Facility: CLINIC | Age: 41
End: 2021-10-04

## 2021-10-04 DIAGNOSIS — K21.9 GASTROESOPHAGEAL REFLUX DISEASE WITHOUT ESOPHAGITIS: Primary | ICD-10-CM

## 2021-10-04 NOTE — TELEPHONE ENCOUNTER
Eus on 12/16/21 with Dr Kellen Arellano at [facility]  Prep instructions mailed to the patient  Did offer sooner appointment with Dr Yoana Ruff and will speak to Dr Octavia Siemens and see if December is ok or to schedule procedure sooner

## 2021-11-16 ENCOUNTER — TELEPHONE (OUTPATIENT)
Dept: GASTROENTEROLOGY | Facility: CLINIC | Age: 41
End: 2021-11-16

## 2021-12-15 ENCOUNTER — TELEPHONE (OUTPATIENT)
Dept: GASTROENTEROLOGY | Facility: HOSPITAL | Age: 41
End: 2021-12-15

## 2021-12-16 ENCOUNTER — ANESTHESIA (OUTPATIENT)
Dept: GASTROENTEROLOGY | Facility: HOSPITAL | Age: 41
End: 2021-12-16

## 2021-12-16 ENCOUNTER — HOSPITAL ENCOUNTER (OUTPATIENT)
Dept: GASTROENTEROLOGY | Facility: HOSPITAL | Age: 41
Setting detail: OUTPATIENT SURGERY
Discharge: HOME | End: 2021-12-16
Attending: INTERNAL MEDICINE | Admitting: INTERNAL MEDICINE
Payer: COMMERCIAL

## 2021-12-16 ENCOUNTER — ANESTHESIA EVENT (OUTPATIENT)
Dept: GASTROENTEROLOGY | Facility: HOSPITAL | Age: 41
End: 2021-12-16

## 2021-12-16 VITALS
TEMPERATURE: 97.3 F | OXYGEN SATURATION: 98 % | DIASTOLIC BLOOD PRESSURE: 62 MMHG | SYSTOLIC BLOOD PRESSURE: 112 MMHG | HEART RATE: 80 BPM | RESPIRATION RATE: 18 BRPM

## 2021-12-16 DIAGNOSIS — K21.9 GASTROESOPHAGEAL REFLUX DISEASE WITHOUT ESOPHAGITIS: ICD-10-CM

## 2021-12-16 LAB
EXT PREGNANCY TEST URINE: NEGATIVE
EXT. CONTROL: NORMAL

## 2021-12-16 PROCEDURE — 88305 TISSUE EXAM BY PATHOLOGIST: CPT | Performed by: PATHOLOGY

## 2021-12-16 PROCEDURE — 88342 IMHCHEM/IMCYTCHM 1ST ANTB: CPT | Performed by: PATHOLOGY

## 2021-12-16 PROCEDURE — 88313 SPECIAL STAINS GROUP 2: CPT | Performed by: PATHOLOGY

## 2021-12-16 PROCEDURE — 43242 EGD US FINE NEEDLE BX/ASPIR: CPT | Performed by: INTERNAL MEDICINE

## 2021-12-16 PROCEDURE — 81025 URINE PREGNANCY TEST: CPT | Performed by: ANESTHESIOLOGY

## 2021-12-16 PROCEDURE — 43239 EGD BIOPSY SINGLE/MULTIPLE: CPT | Performed by: INTERNAL MEDICINE

## 2021-12-16 RX ORDER — SODIUM CHLORIDE 9 MG/ML
INJECTION, SOLUTION INTRAVENOUS CONTINUOUS PRN
Status: DISCONTINUED | OUTPATIENT
Start: 2021-12-16 | End: 2021-12-16

## 2021-12-16 RX ORDER — LIDOCAINE HYDROCHLORIDE 10 MG/ML
0.5 INJECTION, SOLUTION EPIDURAL; INFILTRATION; INTRACAUDAL; PERINEURAL ONCE AS NEEDED
Status: CANCELLED | OUTPATIENT
Start: 2021-12-16

## 2021-12-16 RX ORDER — PROPOFOL 10 MG/ML
INJECTION, EMULSION INTRAVENOUS AS NEEDED
Status: DISCONTINUED | OUTPATIENT
Start: 2021-12-16 | End: 2021-12-16

## 2021-12-16 RX ORDER — PROPOFOL 10 MG/ML
INJECTION, EMULSION INTRAVENOUS CONTINUOUS PRN
Status: DISCONTINUED | OUTPATIENT
Start: 2021-12-16 | End: 2021-12-16

## 2021-12-16 RX ORDER — GLYCOPYRROLATE 0.2 MG/ML
INJECTION INTRAMUSCULAR; INTRAVENOUS AS NEEDED
Status: DISCONTINUED | OUTPATIENT
Start: 2021-12-16 | End: 2021-12-16

## 2021-12-16 RX ORDER — SODIUM CHLORIDE 9 MG/ML
125 INJECTION, SOLUTION INTRAVENOUS CONTINUOUS
Status: CANCELLED | OUTPATIENT
Start: 2021-12-16

## 2021-12-16 RX ADMIN — SODIUM CHLORIDE: 0.9 INJECTION, SOLUTION INTRAVENOUS at 13:26

## 2021-12-16 RX ADMIN — PROPOFOL 20 MG: 10 INJECTION, EMULSION INTRAVENOUS at 13:43

## 2021-12-16 RX ADMIN — PROPOFOL 50 MG: 10 INJECTION, EMULSION INTRAVENOUS at 13:36

## 2021-12-16 RX ADMIN — PROPOFOL 30 MG: 10 INJECTION, EMULSION INTRAVENOUS at 13:37

## 2021-12-16 RX ADMIN — PROPOFOL 30 MG: 10 INJECTION, EMULSION INTRAVENOUS at 13:51

## 2021-12-16 RX ADMIN — GLYCOPYRROLATE 0.2 MG: 0.2 INJECTION, SOLUTION INTRAMUSCULAR; INTRAVENOUS at 13:32

## 2021-12-16 RX ADMIN — PROPOFOL 100 MCG/KG/MIN: 10 INJECTION, EMULSION INTRAVENOUS at 13:32

## 2021-12-16 RX ADMIN — PROPOFOL 100 MG: 10 INJECTION, EMULSION INTRAVENOUS at 13:32

## 2021-12-22 ENCOUNTER — OFFICE VISIT (OUTPATIENT)
Dept: GASTROENTEROLOGY | Facility: CLINIC | Age: 41
End: 2021-12-22
Payer: COMMERCIAL

## 2021-12-22 VITALS
DIASTOLIC BLOOD PRESSURE: 80 MMHG | SYSTOLIC BLOOD PRESSURE: 124 MMHG | HEART RATE: 82 BPM | HEIGHT: 66 IN | BODY MASS INDEX: 23.3 KG/M2 | WEIGHT: 145 LBS

## 2021-12-22 DIAGNOSIS — K21.9 GASTROESOPHAGEAL REFLUX DISEASE WITHOUT ESOPHAGITIS: ICD-10-CM

## 2021-12-22 DIAGNOSIS — Q45.3 ECTOPIC PANCREATIC TISSUE IN STOMACH: ICD-10-CM

## 2021-12-22 DIAGNOSIS — R10.10 PAIN OF UPPER ABDOMEN: Primary | ICD-10-CM

## 2021-12-22 DIAGNOSIS — R63.4 WEIGHT LOSS, UNINTENTIONAL: ICD-10-CM

## 2021-12-22 DIAGNOSIS — Z86.010 PERSONAL HISTORY OF COLONIC POLYPS: ICD-10-CM

## 2021-12-22 PROBLEM — Z86.0100 PERSONAL HISTORY OF COLONIC POLYPS: Status: ACTIVE | Noted: 2021-12-22

## 2021-12-22 PROCEDURE — 99214 OFFICE O/P EST MOD 30 MIN: CPT | Performed by: INTERNAL MEDICINE

## 2022-01-03 ENCOUNTER — TELEPHONE (OUTPATIENT)
Dept: GASTROENTEROLOGY | Facility: CLINIC | Age: 42
End: 2022-01-03

## 2022-01-03 DIAGNOSIS — R10.10 PAIN OF UPPER ABDOMEN: ICD-10-CM

## 2022-01-03 DIAGNOSIS — R93.5 ABNORMAL CT SCAN, PELVIS: Primary | ICD-10-CM

## 2022-01-03 DIAGNOSIS — R18.8 PELVIC FLUID COLLECTION: ICD-10-CM

## 2022-01-03 DIAGNOSIS — N83.209 CYST OF OVARY, UNSPECIFIED LATERALITY: ICD-10-CM

## 2022-01-03 NOTE — TELEPHONE ENCOUNTER
Order for the Pelvic US entered, printed and faxed to [facility]  Confirmation received  Message left for pt to call back

## 2022-01-03 NOTE — TELEPHONE ENCOUNTER
----- Message from Randall Noble MD sent at 12/30/2021  9:12 PM EST -----  I called the patient and reviewed CT scan result  No abnormality in the upper abdomen  There is a little bit of pelvic fluid or ascites and ovarian cyst   Advise pelvic ultrasound  Please arrange  Probably physiologic in nature  I left a voice message for the patient    Thank you

## 2022-01-04 NOTE — TELEPHONE ENCOUNTER
Pt returning call  Relayed above  Provided [facility] Sched ph #  Pt is not sure she will go to [facility]/might CB to advise she is going to sched at alternate facility for orders to be sent

## 2022-01-11 ENCOUNTER — HOSPITAL ENCOUNTER (OUTPATIENT)
Dept: ULTRASOUND IMAGING | Facility: CLINIC | Age: 42
Discharge: HOME | End: 2022-01-11
Payer: COMMERCIAL

## 2022-01-11 DIAGNOSIS — R10.10 PAIN OF UPPER ABDOMEN: ICD-10-CM

## 2022-01-11 DIAGNOSIS — R93.5 ABNORMAL CT SCAN, PELVIS: ICD-10-CM

## 2022-01-11 DIAGNOSIS — N83.209 CYST OF OVARY, UNSPECIFIED LATERALITY: ICD-10-CM

## 2022-01-11 DIAGNOSIS — R18.8 PELVIC FLUID COLLECTION: ICD-10-CM

## 2022-01-11 PROCEDURE — 76856 US EXAM PELVIC COMPLETE: CPT

## 2022-01-11 PROCEDURE — 76830 TRANSVAGINAL US NON-OB: CPT

## 2022-02-09 ENCOUNTER — TELEPHONE (OUTPATIENT)
Dept: GASTROENTEROLOGY | Facility: CLINIC | Age: 42
End: 2022-02-09

## 2022-02-09 NOTE — TELEPHONE ENCOUNTER
Patients GI provider:  [facility] Still    Number to return call: [phone]    Reason for call: Pt calling to speak to someone about her Endoscopic US that was done with Sergio on 12/16/21  Pt states she saw results on the portal but never spoke to someone about what they mean  She also stated that she called [facility] which is where she normally goes and they told her that they couldn't see any of the results  Above is her number       Scheduled procedure/appointment date if applicable: Apt/procedure NA

## 2022-02-18 NOTE — TELEPHONE ENCOUNTER
Pt called again wanting to speak to someone about her results  She had procedure with Mann Laughter and her MyChart is showing some abnormalities and she is concerned  Pt would like to know what is going on and what her next step would be  [phone] is her number

## 2022-02-22 ENCOUNTER — TELEPHONE (OUTPATIENT)
Dept: GASTROENTEROLOGY | Facility: CLINIC | Age: 42
End: 2022-02-22

## 2022-02-22 NOTE — TELEPHONE ENCOUNTER
I called the patient and reviewed pathology results  Biopsies from December of the gastric nodule did reveal some areas of focal intestinal metaplasia  No dysplasia  I had taken other gastric biopsies negative for H pylori and negative for intestinal metaplasia  Patient had concerns about her recall exam for next year  Mention that it would probably be related to the intestinal metaplasia  Patient's father had a history of gastric cancer  Recommend that she proceed with the follow-up as recommended  Patient has problems with epigastric abdominal pain chest tightness and discomfort  I had told her previously that my feeling was that she likely had functional abdominal and chest pain and that the biopsy findings probably did not explain her symptoms  Patient purchased a house in the last 2 years that a lot problems with mold ( [place] toxin ) and toxins  Some of the exposures may affect the digestive system specifically "T2" I told the patient I was not familiar with that environmental toxin  She has taken Pepcid 40 mg daily 20 mg twice a day  Suggested that we could give her trial of pantoprazole 40 mg daily  She would like to hold off on this  She is seen Dr Caroline Martínez - for some alternative therapies  Could provide the prescription patient desires    Thanks

## 2023-10-03 ENCOUNTER — TELEPHONE (OUTPATIENT)
Dept: GASTROENTEROLOGY | Facility: CLINIC | Age: 43
End: 2023-10-03

## 2023-10-03 ENCOUNTER — OFFICE VISIT (OUTPATIENT)
Dept: GASTROENTEROLOGY | Facility: CLINIC | Age: 43
End: 2023-10-03
Payer: COMMERCIAL

## 2023-10-03 VITALS
SYSTOLIC BLOOD PRESSURE: 128 MMHG | WEIGHT: 172 LBS | HEIGHT: 66 IN | DIASTOLIC BLOOD PRESSURE: 80 MMHG | BODY MASS INDEX: 27.64 KG/M2

## 2023-10-03 DIAGNOSIS — Z86.010 PERSONAL HISTORY OF COLONIC POLYPS: ICD-10-CM

## 2023-10-03 DIAGNOSIS — R07.89 OTHER CHEST PAIN: ICD-10-CM

## 2023-10-03 DIAGNOSIS — Q45.3 ECTOPIC PANCREATIC TISSUE IN STOMACH: ICD-10-CM

## 2023-10-03 DIAGNOSIS — R10.10 PAIN OF UPPER ABDOMEN: Primary | ICD-10-CM

## 2023-10-03 PROBLEM — F41.9 ANXIETY: Status: ACTIVE | Noted: 2018-12-03

## 2023-10-03 PROBLEM — J38.1 VOCAL CORD POLYP: Status: ACTIVE | Noted: 2018-12-03

## 2023-10-03 PROBLEM — F32.A DEPRESSION: Status: ACTIVE | Noted: 2018-12-03

## 2023-10-03 PROCEDURE — 99214 OFFICE O/P EST MOD 30 MIN: CPT | Performed by: INTERNAL MEDICINE

## 2023-10-03 NOTE — TELEPHONE ENCOUNTER
Scheduled date of EGD(as of today): 11/15/23  Physician performing EGD: Dr Maude Meredith  Location of EGD: [facility] ([facility])  Instructions reviewed with patient by: Sophia Chicas  Clearances: No

## 2023-10-03 NOTE — PROGRESS NOTES
[facility] - Outpatient Follow-up Note  Tanna Ward 44 y.o. female MRN: [id number]  Encounter: [id number]    ASSESSMENT AND PLAN:      1. Pain of upper abdomen  This is a 81-year-old female here today for follow-up. Long history of epigastric abdominal pain, chest pains. Recently had another episode landing her in the emergency room. Unclear if this is all from esophageal spasms. Question eosinophilic esophagitis. She feels that she cannot just take any medications because it worsens her symptoms.    - EGD; Future  - Espoh manometry/24hr ph; Future  -Pending above, considering trying some PPI therapy that is not on capsule formulation    2. Other chest pain    - EGD; Future  - Espoh manometry/24hr ph; Future    3. 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 We will rebiopsy the antral nodule at time of EGD. 4. Personal history of colonic polyps  Recall September 2026      Followup Appointment: 3 months  ______________________________________________________________________    Chief Complaint   Patient presents with   • Follow up-still having upper abd. pain     HPI: This is a 81-year-old female here today for follow-up. Previously evaluated 2 years ago for epigastric pain, burning, chest pains. Patient states that this has not been improved despite being on various medications including PPI, H2 RA. Patient previously even had an endoscopic ultrasound for an antral nodule which was overall unremarkable. 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 Regardless, recently she was in the emergency room because of this chest pain. She states that about 1-2 times a week anytime she eats she gets severe chest pains. She thought she was having a heart attack and went to the emergency room but had a negative work-up.   The pains last anywhere between minutes to hours. Sometimes nausea. No significant vomiting or GI bleeding. Sometimes loose stools follow. Historical Information   Past Medical History:   Diagnosis Date   • Colon polyps    • Fibroadenoma of breast    • GERD (gastroesophageal reflux disease)      Past Surgical History:   Procedure Laterality Date   • BREAST SURGERY Right    • CHOLECYSTECTOMY     • COLONOSCOPY     • ENDOSCOPIC ULTRASOUND (UPPER)  12/2021    Need follow up   • UPPER GASTROINTESTINAL ENDOSCOPY       Social History     Substance and Sexual Activity   Alcohol Use Never     Social History     Substance and Sexual Activity   Drug Use Never     Social History     Tobacco Use   Smoking Status Never   Smokeless Tobacco Never     Family History   Problem Relation Age of Onset   • Autoimmune disease Mother    • Rheum arthritis Mother    • Brain cancer Father    • No Known Problems Sister    • No Known Problems Brother    • No Known Problems Sister    • No Known Problems Brother    • Colon polyps Neg Hx    • Colon cancer Neg Hx          Current Outpatient Medications:   •  famotidine (PEPCID) 40 MG tablet  Allergies   Allergen Reactions   • Corn Oil - Food Allergy - Food Allergy GI Intolerance   • Diclegis [Doxylamine-Pyridoxine] Hives   • Doxycycline Hives   • Eggs Or Egg-Derived Products - Food Allergy GI Intolerance   • Nyamyc [Nystatin] GI Intolerance   • Peanut Oil - Food Allergy GI Intolerance   • Prilosec [Omeprazole] Hives   • Shellfish Allergy - Food Allergy GI Intolerance     Reviewed medications and allergies and updated as indicated    PHYSICAL EXAM:    Blood pressure 128/80, height 5' 6" (1.676 m), weight 78 kg (172 lb). Body mass index is 27.76 kg/m². General Appearance: NAD, cooperative, alert  Eyes: Anicteric, PERRLA, EOMI  ENT:  Normocephalic, atraumatic, normal mucosa.     Neck:  Supple, symmetrical, trachea midline  Resp:  Clear to auscultation bilaterally; no rales, rhonchi or wheezing; respirations unlabored   CV:  S1 S2, Regular rate and rhythm; no murmur, rub, or gallop. GI:  Soft, non-tender, non-distended; normal bowel sounds; no masses, no organomegaly   Rectal: Deferred  Musculoskeletal: No cyanosis, clubbing or edema. Normal ROM. Skin:  No jaundice, rashes, or lesions   Heme/Lymph: No palpable cervical lymphadenopathy  Psych: Normal affect, good eye contact  Neuro: No gross deficits, AAOx3    Lab Results:   Lab Results   Component Value Date    WBC 8.12 03/20/2021    HGB 14.6 03/20/2021    HCT 44.9 03/20/2021    MCV 91 03/20/2021     03/20/2021     Lab Results   Component Value Date    K 3.8 03/20/2021     03/20/2021    CO2 28 03/20/2021    BUN 11 03/20/2021    CREATININE 0.95 03/20/2021    CALCIUM 8.9 03/20/2021    AST 17 03/20/2021    ALT 30 03/20/2021    ALKPHOS 45 (L) 03/20/2021    EGFR 76 03/20/2021     No results found for: "IRON", "TIBC", "FERRITIN"  No results found for: "LIPASE"    Radiology Results:   No results found. Answers for HPI/ROS submitted by the patient on 10/3/2023  Chronicity: recurrent  Onset: more than 1 year ago  Onset quality: undetermined  Frequency: constantly  Episode duration: 6 Months  Progression since onset: waxing and waning  Pain location: epigastric region  Pain - numeric: 8/10  Pain quality: aching, dull, a sensation of fullness, sharp  Radiates to: left shoulder  anorexia: Yes  arthralgias: Yes  diarrhea:  Yes

## 2023-11-01 ENCOUNTER — ANESTHESIA EVENT (OUTPATIENT)
Dept: ANESTHESIOLOGY | Facility: AMBULATORY SURGERY CENTER | Age: 43
End: 2023-11-01

## 2023-11-01 ENCOUNTER — ANESTHESIA (OUTPATIENT)
Dept: ANESTHESIOLOGY | Facility: AMBULATORY SURGERY CENTER | Age: 43
End: 2023-11-01

## 2023-11-08 ENCOUNTER — TELEPHONE (OUTPATIENT)
Dept: GASTROENTEROLOGY | Facility: CLINIC | Age: 43
End: 2023-11-08

## 2023-11-15 ENCOUNTER — ANESTHESIA (OUTPATIENT)
Dept: GASTROENTEROLOGY | Facility: AMBULATORY SURGERY CENTER | Age: 43
End: 2023-11-15

## 2023-11-15 ENCOUNTER — TELEPHONE (OUTPATIENT)
Age: 43
End: 2023-11-15

## 2023-11-15 ENCOUNTER — HOSPITAL ENCOUNTER (OUTPATIENT)
Dept: GASTROENTEROLOGY | Facility: AMBULATORY SURGERY CENTER | Age: 43
Discharge: HOME | End: 2023-11-15
Attending: INTERNAL MEDICINE
Payer: COMMERCIAL

## 2023-11-15 ENCOUNTER — ANESTHESIA EVENT (OUTPATIENT)
Dept: GASTROENTEROLOGY | Facility: AMBULATORY SURGERY CENTER | Age: 43
End: 2023-11-15

## 2023-11-15 VITALS
HEART RATE: 86 BPM | SYSTOLIC BLOOD PRESSURE: 127 MMHG | WEIGHT: 170 LBS | OXYGEN SATURATION: 99 % | RESPIRATION RATE: 19 BRPM | HEIGHT: 66 IN | BODY MASS INDEX: 27.32 KG/M2 | DIASTOLIC BLOOD PRESSURE: 62 MMHG | TEMPERATURE: 98.9 F

## 2023-11-15 DIAGNOSIS — R07.89 OTHER CHEST PAIN: ICD-10-CM

## 2023-11-15 DIAGNOSIS — R10.10 PAIN OF UPPER ABDOMEN: ICD-10-CM

## 2023-11-15 DIAGNOSIS — K21.9 GASTROESOPHAGEAL REFLUX DISEASE WITHOUT ESOPHAGITIS: ICD-10-CM

## 2023-11-15 LAB
EXT PREGNANCY TEST URINE: NEGATIVE
EXT. CONTROL: NORMAL

## 2023-11-15 PROCEDURE — 43239 EGD BIOPSY SINGLE/MULTIPLE: CPT | Performed by: INTERNAL MEDICINE

## 2023-11-15 PROCEDURE — 88313 SPECIAL STAINS GROUP 2: CPT | Performed by: STUDENT IN AN ORGANIZED HEALTH CARE EDUCATION/TRAINING PROGRAM

## 2023-11-15 PROCEDURE — 88305 TISSUE EXAM BY PATHOLOGIST: CPT | Performed by: STUDENT IN AN ORGANIZED HEALTH CARE EDUCATION/TRAINING PROGRAM

## 2023-11-15 RX ORDER — FAMOTIDINE 40 MG/1
40 TABLET, FILM COATED ORAL AS NEEDED
Start: 2023-11-15 | End: 2024-01-14

## 2023-11-15 RX ORDER — GLYCOPYRROLATE 0.2 MG/ML
INJECTION INTRAMUSCULAR; INTRAVENOUS AS NEEDED
Status: DISCONTINUED | OUTPATIENT
Start: 2023-11-15 | End: 2023-11-15

## 2023-11-15 RX ORDER — LIDOCAINE HYDROCHLORIDE 10 MG/ML
INJECTION, SOLUTION EPIDURAL; INFILTRATION; INTRACAUDAL; PERINEURAL AS NEEDED
Status: DISCONTINUED | OUTPATIENT
Start: 2023-11-15 | End: 2023-11-15

## 2023-11-15 RX ORDER — PROPOFOL 10 MG/ML
INJECTION, EMULSION INTRAVENOUS AS NEEDED
Status: DISCONTINUED | OUTPATIENT
Start: 2023-11-15 | End: 2023-11-15

## 2023-11-15 RX ORDER — SODIUM CHLORIDE, SODIUM LACTATE, POTASSIUM CHLORIDE, CALCIUM CHLORIDE 600; 310; 30; 20 MG/100ML; MG/100ML; MG/100ML; MG/100ML
50 INJECTION, SOLUTION INTRAVENOUS CONTINUOUS
Status: DISCONTINUED | OUTPATIENT
Start: 2023-11-15 | End: 2023-11-19 | Stop reason: HOSPADM

## 2023-11-15 RX ADMIN — LIDOCAINE HYDROCHLORIDE 100 MG: 10 INJECTION, SOLUTION EPIDURAL; INFILTRATION; INTRACAUDAL; PERINEURAL at 09:00

## 2023-11-15 RX ADMIN — PROPOFOL 150 MG: 10 INJECTION, EMULSION INTRAVENOUS at 09:00

## 2023-11-15 RX ADMIN — GLYCOPYRROLATE 0.2 MG: 0.2 INJECTION INTRAMUSCULAR; INTRAVENOUS at 09:00

## 2023-11-15 RX ADMIN — PROPOFOL 50 MG: 10 INJECTION, EMULSION INTRAVENOUS at 09:01

## 2023-11-15 RX ADMIN — PROPOFOL 50 MG: 10 INJECTION, EMULSION INTRAVENOUS at 09:02

## 2023-11-15 RX ADMIN — SODIUM CHLORIDE, SODIUM LACTATE, POTASSIUM CHLORIDE, CALCIUM CHLORIDE 50 ML/HR: 600; 310; 30; 20 INJECTION, SOLUTION INTRAVENOUS at 08:55

## 2023-11-15 NOTE — H&P
History and Physical - [address] [place] [address] [facility]    Sarai Butt 44 y.o. female MRN: [id number]      HPI: Sarai Butt is a 44 y.o. female who presents for abd pain/chest pain. 1. Pain of upper abdomen  Unclear if this is all from esophageal spasms. Question eosinophilic esophagitis. She feels that she cannot just take any medications because it worsens her symptoms.     - EGD; Future  - Espoh manometry/24hr ph; Future  -Pending above, considering trying some PPI therapy that is not on capsule formulation     2. Other chest pain     - EGD; Future  - Espoh manometry/24hr ph; Future     3. 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 We will rebiopsy the antral nodule at time of EGD. Allergies   Allergen Reactions    Corn Oil - Food Allergy - Food Allergy GI Intolerance    Diclegis [Doxylamine-Pyridoxine] Hives    Doxycycline Hives    Eggs Or Egg-Derived Products - Food Allergy GI Intolerance    Nyamyc [Nystatin] GI Intolerance    Peanut Oil - Food Allergy GI Intolerance    Prilosec [Omeprazole] Hives    Shellfish Allergy - Food Allergy GI Intolerance         REVIEW OF SYSTEMS: Per the HPI, and otherwise unremarkable.     Historical Information     Past Medical History:   Diagnosis Date    Colon polyps     Fibroadenoma of breast     GERD (gastroesophageal reflux disease)     Perioral dermatitis     face     Past Surgical History:   Procedure Laterality Date    BREAST SURGERY Right     CHOLECYSTECTOMY      COLONOSCOPY      ENDOSCOPIC ULTRASOUND (UPPER)  12/2021    Need follow up    UPPER GASTROINTESTINAL ENDOSCOPY       Social History   Social History     Substance and Sexual Activity   Alcohol Use Never     Social History     Substance and Sexual Activity   Drug Use Never     Social History     Tobacco Use   Smoking Status Never   Smokeless Tobacco Never     Family History   Problem Relation Age of Onset    Autoimmune disease Mother     Rheum arthritis Mother     Brain cancer Father     No Known Problems Sister     No Known Problems Brother     No Known Problems Sister     No Known Problems Brother     Colon polyps Neg Hx     Colon cancer Neg Hx        Meds/Allergies       Current Outpatient Medications:     famotidine (PEPCID) 40 MG tablet    Current Facility-Administered Medications:     lactated ringers infusion, 50 mL/hr, Intravenous, Continuous        Objective     /86   Pulse 87   Temp 98.9 °F (37.2 °C) (Temporal)   Resp 19   Ht 5' 6" (1.676 m)   Wt 77.1 kg (170 lb)   LMP 11/10/2023 (Exact Date)   SpO2 100%   BMI 27.44 kg/m²       PHYSICAL EXAM    Gen: NAD AAOx3  Head: Normocephalic, Atraumatic  CV: S1S2 RRR no m/r/g  CHEST: Clear b/l no c/r/w  ABD: soft, +BS NT/ND no masses  EXT: no edema      ASSESSMENT/PLAN:  This is a 44y.o. year old female here for EGD with biopsies, and she is stable and optimized for her procedure.

## 2023-11-15 NOTE — ANESTHESIA POSTPROCEDURE EVALUATION
Post-Op Assessment Note    CV Status:  Stable  Pain Score: 0    Pain management: adequate       Mental Status:  Alert and awake   Hydration Status:  Euvolemic and stable   PONV Controlled:  None   Airway Patency:  Patent     Post Op Vitals Reviewed: Yes    No anethesia notable event occurred.     Staff: CRNA               /58 (11/15/23 0905)    Temp      Pulse 92 (11/15/23 0905)   Resp 15 (11/15/23 0905)    SpO2 98 % (11/15/23 0905)

## 2023-11-15 NOTE — ANESTHESIA PROCEDURE NOTES
Anesthesia Notable Event    Date/Time: 11/15/2023 9:46 AM    Performed by: Maricruz Delgado MD  Authorized by: Maricruz Delgado MD

## 2023-11-15 NOTE — TELEPHONE ENCOUNTER
Patients GI provider:  Dr. Dominguez    Number to return call: [phone]    Reason for call: Pt calling to schedule Manometry.     Scheduled procedure/appointment date if applicable: 0/7/7005

## 2023-11-15 NOTE — ANESTHESIA PREPROCEDURE EVALUATION
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 Vocal Cord Polyp  Physical Exam    Airway    Mallampati score: II  TM Distance: >3 FB  Neck ROM: full     Dental   No notable dental hx     Cardiovascular      Pulmonary   Breath sounds clear to auscultation    Other Findings        Anesthesia Plan  ASA Score- 2     Anesthesia Type- IV sedation with anesthesia with ASA Monitors. Additional Monitors:     Airway Plan:            Plan Factors-Exercise tolerance (METS): >4 METS. Chart reviewed. Patient summary reviewed. Patient is not a current smoker. Induction- intravenous. Postoperative Plan-     Informed Consent- Anesthetic plan and risks discussed with patient. I personally reviewed this patient with the CRNA. Discussed and agreed on the Anesthesia Plan with the CRNA. Genia German

## 2023-11-16 ENCOUNTER — TELEPHONE (OUTPATIENT)
Dept: GASTROENTEROLOGY | Facility: CLINIC | Age: 43
End: 2023-11-16

## 2023-11-17 ENCOUNTER — TELEPHONE (OUTPATIENT)
Dept: GASTROENTEROLOGY | Facility: CLINIC | Age: 43
End: 2023-11-17

## 2023-11-17 DIAGNOSIS — R07.89 OTHER CHEST PAIN: ICD-10-CM

## 2023-11-17 DIAGNOSIS — R10.10 PAIN OF UPPER ABDOMEN: Primary | ICD-10-CM

## 2023-11-17 DIAGNOSIS — K21.9 GASTROESOPHAGEAL REFLUX DISEASE WITHOUT ESOPHAGITIS: ICD-10-CM

## 2023-11-17 NOTE — TELEPHONE ENCOUNTER
Last ov 10/3/23 Dr. Rajani Carranza pain upper abd, EGD 11/15/23     Patient experiencing pain with eating post procedure. She is taking Pepcid as ordered (she can't swallow pills so opens capsule to take medication). She feels a dull, sore area after eating, it hits a spot and has pain. It does dissipate after a few minutes. She tried soup with soft noodles and it happens. Her  was also rubbing her back and she experienced pain. She has esophageal spasms when taking pills so she tries to get capsules, liquids. I advised she can try Tylenol capsules or liquid for pain and I reviewed there is a medication Carafate that could possibly help but that would be up to provider to order. I did review that she could make a slurry with tablets since liquid is not always covered by insurance. She would like to speak with provider since she is concerned about this. I did confirm she received path results. I confirmed [facility] [address] [facility]. Please reach out to patient.

## 2023-11-17 NOTE — TELEPHONE ENCOUNTER
Spoke to patient via telephone:    Pain after eating has been progressively worse after EGDs, this time is worst so far. Feels most pain when food bolus gets down to L ribs area. Aching in quality. Able to drink warm liquids today, still having pain w/ solids. Unable to swallow capsules/tablets due to esophageal spasms - has been crushing/opening. Will trial esomeprazole 20 mg QAM in applesauce/yogurt/pudding and Maalox prn. Patient to call back if no better by 11/22. Proceed to the nearest emergency room right away with fevers > 104 F, severe headache, loss of consciousness, sudden blurry vision, inability to swallow, increased chest pain/palpitations, shortness of breath/difficulty breathing, severe/intractable abdominal pain/nausea/vomiting/diarrhea, blood in stool/urine, sudden new one-sided leg swelling, or any severe pain that does not resolve.

## 2023-11-17 NOTE — TELEPHONE ENCOUNTER
Patients GI provider:  Dr. He Bardales    Number to return call: [phone]    Reason for call: Pt calling stating she had a egd on 11/15 with Dr. He Bardales and has been experiencing pain while eating. She states a nurse called yesterday to check in on her following her procedure and was supposed to send a message for clinical staff to call her back. I didn't see a note, please advise.     Scheduled procedure/appointment date if applicable: Apt 1/8

## 2023-11-17 NOTE — TELEPHONE ENCOUNTER
Pt calling in to update [facility], pt called pharmacy and they were not able to tell her if Nexium contains cornstarch but most of them do and to be on the safe side would not advise her to try it. Pt also wanted noted that in the past she had a severe allergic reaction to omeprazole- it started out as hives and then her whole body was swollen and red.

## 2023-11-21 NOTE — TELEPHONE ENCOUNTER
Called patient. No answer. I left a message informing patient to stick with Famotidine and Maalox to be safe.

## 2025-02-24 ENCOUNTER — TELEPHONE (OUTPATIENT)
Dept: OTHER | Facility: OTHER | Age: 45
End: 2025-02-24

## 2025-03-11 ENCOUNTER — OFFICE VISIT (OUTPATIENT)
Dept: OBGYN CLINIC | Facility: CLINIC | Age: 45
End: 2025-03-11
Payer: COMMERCIAL

## 2025-03-11 VITALS
DIASTOLIC BLOOD PRESSURE: 60 MMHG | SYSTOLIC BLOOD PRESSURE: 128 MMHG | BODY MASS INDEX: 25.17 KG/M2 | HEIGHT: 66 IN | WEIGHT: 156.6 LBS

## 2025-03-11 DIAGNOSIS — N63.11 MASS OF UPPER OUTER QUADRANT OF RIGHT BREAST: Primary | ICD-10-CM

## 2025-03-11 PROCEDURE — 99203 OFFICE O/P NEW LOW 30 MIN: CPT | Performed by: OBSTETRICS & GYNECOLOGY

## 2025-03-11 NOTE — LETTER
"2025     Amarjit Jarrett,   [address] [facility] [address]    Patient: Sarah Barahona   YOB: 1980   Date of Visit: 3/11/2025       Dear Dr. Jarrett:    Thank you for referring Sarah Barahona to me for evaluation. Below are my notes for this consultation.    If you have questions, please do not hesitate to call me. I look forward to following your patient along with you.         Sincerely,        Dedra Davis MD        CC: No Recipients    Dedra Davis MD  3/11/2025 12:27 PM  Sign when Signing Visit  [facility] [address], [address], [place] [address]    Assessment & Plan  Mass of upper outer quadrant of right breast  Mass right upper outer quadrant.  Mobile.  Not painful.  Pt states has persisted for 2 months but maybe smaller with time.  No skin or nipple changes.  Has not had mammogram since 2018 when nursing and had cyst.  Also h/o fibroadenoma right breast  per pt.  Recom bilateral diagnostic mammogram and right ultrasound.      Orders:  •  Mammo diagnostic bilateral w 3d and cad; Future  •  US breast right limited (diagnostic); Future      Next appt: Scheduled [facility] Well[facility]      Subjective:   Sarah Barahona is a 44 y.o.  female.    HPI:   Sarah presents with c/o right breast lump.  Noted 2 months ago on right breast.  Not painful.  No skin or nipple changes.  States mobile.  Feels maybe smaller in last month but unsure.    No screening mammography in past.    Last mammo 2018.  Had complex cyst right side - benign, but was nursing.  Pt states \"drained\" and no issues after.   h/o right breast fibroadenoma.  No family breast cancer history.  No screening mammography because \"been busy with the kids\".    Last pap or gyn 2019 after delivery last child.        Gyn History  Patient's last menstrual period was 2025.       Last pap smear: Not on file    She  reports being sexually active.       OB History      Past Medical " "History:  No date: Colon polyps  No date: Fibroadenoma of breast  No date: GERD (gastroesophageal reflux disease)  No date: Perioral dermatitis      Comment:  face     Past Surgical History:  2010: BREAST SURGERY; Right      Comment:  fibroadenoma  No date: CHOLECYSTECTOMY  No date: COLONOSCOPY  12/2021: ENDOSCOPIC ULTRASOUND (UPPER)      Comment:  Need follow up  No date: UPPER GASTROINTESTINAL ENDOSCOPY     Social History     Tobacco Use   • Smoking status: Never   • Smokeless tobacco: Never   Vaping Use   • Vaping status: Never Used   Substance Use Topics   • Alcohol use: Never   • Drug use: Never          Current Outpatient Medications:   •  esomeprazole (NexIUM) 20 mg capsule, Take 1 capsule (20 mg total) by mouth every morning Open the capsule into a spoonful of applesauce/yogurt/pudding and take 30 min prior to your first meal of the day (Patient not taking: Reported on 3/11/2025), Disp: 30 capsule, Rfl: 1    She is allergic to corn oil - food allergy - food allergy, diclegis [doxylamine-pyridoxine], doxycycline, eggs or egg-derived products - food allergy, nyamyc [nystatin], peanut oil - food allergy, prilosec [omeprazole], and shellfish allergy - food allergy..    ROS: Review of Systems   Constitutional: Negative.    Gastrointestinal: Negative.    Genitourinary: Negative.    Skin:         Right breast lump   Psychiatric/Behavioral: Negative.         Objective:  /60 (BP Location: Left arm, Patient Position: Sitting, Cuff Size: Standard)   Ht 5' 6\" (1.676 m)   Wt 71 kg (156 lb 9.6 oz)   LMP 02/19/2025   BMI 25.28 kg/m²      Physical Exam  Constitutional:       Appearance: Normal appearance.   Chest:   Breasts:     Right: Mass present. No bleeding, inverted nipple, nipple discharge, skin change or tenderness.      Left: Normal. No mass or tenderness.          Comments: 4-5cm mobile flat mass vs density right breast upper outer quadrant  Neurological:      Mental Status: She is alert.           "

## 2025-03-11 NOTE — PROGRESS NOTES
"[facility] [address], [address], [place] [address]    Assessment & Plan  Mass of upper outer quadrant of right breast  Mass right upper outer quadrant.  Mobile.  Not painful.  Pt states has persisted for 2 months but maybe smaller with time.  No skin or nipple changes.  Has not had mammogram since 2018 when nursing and had cyst.  Also h/o fibroadenoma right breast  per pt.  Recom bilateral diagnostic mammogram and right ultrasound.      Orders:    Mammo diagnostic bilateral w 3d and cad; Future    US breast right limited (diagnostic); Future      Next appt: Scheduled GYN Wellness      Subjective:   Sarah Barahona is a 44 y.o.  female.    HPI:   Sarah presents with c/o right breast lump.  Noted 2 months ago on right breast.  Not painful.  No skin or nipple changes.  States mobile.  Feels maybe smaller in last month but unsure.    No screening mammography in past.    Last mammo 2018.  Had complex cyst right side - benign, but was nursing.  Pt states \"drained\" and no issues after.   h/o right breast fibroadenoma.  No family breast cancer history.  No screening mammography because \"been busy with the kids\".    Last pap or gyn 2019 after delivery last child.        Gyn History  Patient's last menstrual period was 2025.       Last pap smear: Not on file    She  reports being sexually active.       OB History      Past Medical History:  No date: Colon polyps  No date: Fibroadenoma of breast  No date: GERD (gastroesophageal reflux disease)  No date: Perioral dermatitis      Comment:  face     Past Surgical History:  2010: BREAST SURGERY; Right      Comment:  fibroadenoma  No date: CHOLECYSTECTOMY  No date: COLONOSCOPY  2021: ENDOSCOPIC ULTRASOUND (UPPER)      Comment:  Need follow up  No date: UPPER GASTROINTESTINAL ENDOSCOPY     Social History     Tobacco Use    Smoking status: Never    Smokeless tobacco: Never   Vaping Use    Vaping status: Never Used   Substance Use " "Topics    Alcohol use: Never    Drug use: Never          Current Outpatient Medications:     esomeprazole (NexIUM) 20 mg capsule, Take 1 capsule (20 mg total) by mouth every morning Open the capsule into a spoonful of applesauce/yogurt/pudding and take 30 min prior to your first meal of the day (Patient not taking: Reported on 3/11/2025), Disp: 30 capsule, Rfl: 1    She is allergic to corn oil - food allergy - food allergy, diclegis [doxylamine-pyridoxine], doxycycline, eggs or egg-derived products - food allergy, nyamyc [nystatin], peanut oil - food allergy, prilosec [omeprazole], and shellfish allergy - food allergy..    ROS: Review of Systems   Constitutional: Negative.    Gastrointestinal: Negative.    Genitourinary: Negative.    Skin:         Right breast lump   Psychiatric/Behavioral: Negative.         Objective:  /60 (BP Location: Left arm, Patient Position: Sitting, Cuff Size: Standard)   Ht 5' 6\" (1.676 m)   Wt 71 kg (156 lb 9.6 oz)   LMP 02/19/2025   BMI 25.28 kg/m²      Physical Exam  Constitutional:       Appearance: Normal appearance.   Chest:   Breasts:     Right: Mass present. No bleeding, inverted nipple, nipple discharge, skin change or tenderness.      Left: Normal. No mass or tenderness.          Comments: 4-5cm mobile flat mass vs density right breast upper outer quadrant  Neurological:      Mental Status: She is alert.         "

## 2025-03-17 ENCOUNTER — RESULTS FOLLOW-UP (OUTPATIENT)
Dept: LABOR AND DELIVERY | Facility: HOSPITAL | Age: 45
End: 2025-03-17

## 2025-03-20 ENCOUNTER — RESULTS FOLLOW-UP (OUTPATIENT)
Dept: LABOR AND DELIVERY | Facility: HOSPITAL | Age: 45
End: 2025-03-20

## 2025-04-14 ENCOUNTER — OFFICE VISIT (OUTPATIENT)
Dept: OBGYN CLINIC | Facility: CLINIC | Age: 45
End: 2025-04-14
Payer: COMMERCIAL

## 2025-04-14 ENCOUNTER — NURSE TRIAGE (OUTPATIENT)
Age: 45
End: 2025-04-14

## 2025-04-14 VITALS
BODY MASS INDEX: 26.42 KG/M2 | HEIGHT: 66 IN | DIASTOLIC BLOOD PRESSURE: 68 MMHG | WEIGHT: 164.4 LBS | SYSTOLIC BLOOD PRESSURE: 116 MMHG

## 2025-04-14 DIAGNOSIS — N63.42 SUBAREOLAR MASS OF LEFT BREAST: Primary | ICD-10-CM

## 2025-04-14 DIAGNOSIS — N61.0 INFECTION OF LEFT BREAST: ICD-10-CM

## 2025-04-14 PROCEDURE — 99213 OFFICE O/P EST LOW 20 MIN: CPT | Performed by: NURSE PRACTITIONER

## 2025-04-14 RX ORDER — CEPHALEXIN 500 MG/1
500 CAPSULE ORAL EVERY 6 HOURS SCHEDULED
Qty: 40 CAPSULE | Refills: 0 | Status: SHIPPED | OUTPATIENT
Start: 2025-04-14 | End: 2025-04-24

## 2025-04-14 NOTE — PROGRESS NOTES
[facility] [place]  [address] [place] [address]    Assessment/Plan:  1. Subareolar mass of left breast  -     US breast left limited (diagnostic); Future; Expected date: 2025  -     CBC and differential; Future  -     CBC and differential  -     cephalexin (KEFLEX) 500 mg capsule; Take 1 capsule (500 mg total) by mouth every 6 (six) hours for 10 days  2. Infection of left breast  -     CBC and differential; Future  -     cephalexin (KEFLEX) 500 mg capsule; Take 1 capsule (500 mg total) by mouth every 6 (six) hours for 10 days    Assessment & Plan  Subareolar mass of left breast  Palpable swelling, likely secondary to infection.     Orders:    US breast left limited (diagnostic); Future    CBC and differential; Future    cephalexin (KEFLEX) 500 mg capsule; Take 1 capsule (500 mg total) by mouth every 6 (six) hours for 10 days    Infection of left breast  Start antibiotic treatment  Ibuprofen prn for pain, warm compresses for comfort.   Recommend visit to ED if feeling worse or if not improving in 48 hours.   Follow up visit in 3-4 days for recheck  Advised probiotic such as Florastor with antibiotic treatment.  Orders:    CBC and differential; Future    cephalexin (KEFLEX) 500 mg capsule; Take 1 capsule (500 mg total) by mouth every 6 (six) hours for 10 days             Subjective:   Sarah Barahona is a 44 y.o.  female.    HPI:   44 year old female presents for office visit with complaints of left breast pain and swelling that started 4 days ago. Reports abrupt onset, accompanied by fever x 3 days, no fever today. Pt feels that pain is worsening. Also noticed redness and some crusty nipple discharge x 1, which has not happened again. Generally feels unwell. Reports that kids have also been sick with a viral illness for the last several days. No nausea/vomiting, no body aches or chills. Having some back pain in left upper back, feels that it is radiating from the front.         Gyn  "History  Patient's last menstrual period was 2025 (exact date).       Last pap smear: Not on file    She  reports being sexually active.       OB History      Past Medical History:  No date: Colon polyps  No date: Fibroadenoma of breast  No date: GERD (gastroesophageal reflux disease)  No date: Perioral dermatitis      Comment:  face     Past Surgical History:  2010: BREAST SURGERY; Right      Comment:  fibroadenoma  No date: CHOLECYSTECTOMY  No date: COLONOSCOPY  2021: ENDOSCOPIC ULTRASOUND (UPPER)      Comment:  Need follow up  No date: UPPER GASTROINTESTINAL ENDOSCOPY     Social History     Tobacco Use    Smoking status: Never    Smokeless tobacco: Never   Vaping Use    Vaping status: Never Used   Substance Use Topics    Alcohol use: Never    Drug use: Never          Current Outpatient Medications:     cephalexin (KEFLEX) 500 mg capsule, Take 1 capsule (500 mg total) by mouth every 6 (six) hours for 10 days, Disp: 40 capsule, Rfl: 0    esomeprazole (NexIUM) 20 mg capsule, Take 1 capsule (20 mg total) by mouth every morning Open the capsule into a spoonful of applesauce/yogurt/pudding and take 30 min prior to your first meal of the day (Patient not taking: Reported on 3/11/2025), Disp: 30 capsule, Rfl: 1    She is allergic to corn oil - food allergy - food allergy, diclegis [doxylamine-pyridoxine], doxycycline, eggs or egg-derived products - food allergy, nyamyc [nystatin], peanut oil - food allergy, prilosec [omeprazole], and shellfish allergy - food allergy..    ROS: Review of Systems  See HPI for details otherwise negative.  Objective:  /68 (BP Location: Right arm, Patient Position: Sitting, Cuff Size: Standard)   Ht 5' 6\" (1.676 m)   Wt 74.6 kg (164 lb 6.4 oz)   LMP 2025 (Exact Date)   Breastfeeding No   BMI 26.53 kg/m²      Physical Exam  Constitutional:       General: She is not in acute distress.     Appearance: Normal appearance. She is not ill-appearing.   HENT:      Head: " Normocephalic and atraumatic.   Pulmonary:      Effort: Pulmonary effort is normal.   Chest:   Breasts:     Right: Normal. No swelling, mass, skin change or tenderness.      Left: Swelling, mass and tenderness present. No nipple discharge or skin change.          Comments: Large area of swelling noted beneath left areola, tender, mild erythema  Musculoskeletal:         General: No swelling.   Lymphadenopathy:      Upper Body:      Right upper body: No supraclavicular or axillary adenopathy.      Left upper body: No supraclavicular or axillary adenopathy.   Skin:     General: Skin is warm and dry.   Neurological:      Mental Status: She is alert.   Psychiatric:         Thought Content: Thought content normal.

## 2025-04-14 NOTE — TELEPHONE ENCOUNTER
"FOLLOW UP: appointment scheduled in office    REASON FOR CONVERSATION: Breast Pain    SYMPTOMS: breast swelling, breast pain, redness, skin hardened around areola, dried green / yellow discharge at nipple, left arm sore    OTHER: Patient called office complaining of breast swelling, breast pain, redness, skin hardened around areola, dried green / yellow discharge at nipple, left arm sore that started about 4-5 days ago. She also states she had a 101 fever that lasted 2-3 days, has now subsided and was fatigued and achy last week. She denies feeling a breast lump. Scheduled patient an appointment in office today. She states her children are sick, she is unsure if she had a mix of being sick as well.       DISPOSITION: See Within 3 Days in Office              Reason for Disposition   Patient wants to be seen    Answer Assessment - Initial Assessment Questions  1. SYMPTOM: \"What's the main symptom you're concerned about?\"  (e.g., lump, nipple discharge, pain, rash )      Swelling, pain, redness, breast hard around areola, skin is crusted around nipple ( dried green / yellow discharge) ,   left arm sore/ uncomfortable.    2. LOCATION: \"Where is the pain located?\"      Left breast , left arm   3. ONSET: \"When did symptoms  start?\"      4-5 days   4. PRIOR HISTORY: \"Do you have any history of prior problems with your breasts?\" (e.g., breast cancer, breast implant, fibrocystic breast disease)      Cyst in right breast, fibroadenoma right breast.    5. CAUSE: \"What do you think is causing this symptom?\"      Unknown   6. OTHER SYMPTOMS: \"Do you have any other symptoms?\" (e.g., fever, breast pain, nipple discharge, redness or rash)      Fever (101 - 2-3 days) , fatigue, achy last week   7. PREGNANCY-BREASTFEEDING: \"Is there any chance you are pregnant?\" \"When was your last menstrual period?\" \"Are you breastfeeding?\"      No. LMP:  4/9/25    Protocols used: Breast Symptoms-Adult-[place]    "

## 2025-04-14 NOTE — PATIENT INSTRUCTIONS
Please go to the lab for your blood work today  Take antibiotics as directed  Go to the ED if you are feeling worse or if no improvement within 48 hours of starting antibiotic treatment.

## 2025-04-15 ENCOUNTER — RESULTS FOLLOW-UP (OUTPATIENT)
Dept: OBGYN CLINIC | Facility: CLINIC | Age: 45
End: 2025-04-15

## 2025-04-15 LAB
BASOPHILS # BLD AUTO: 98 CELLS/UL (ref 0–200)
BASOPHILS NFR BLD AUTO: 0.9 %
EOSINOPHIL # BLD AUTO: 218 CELLS/UL (ref 15–500)
EOSINOPHIL NFR BLD AUTO: 2 %
ERYTHROCYTE [DISTWIDTH] IN BLOOD BY AUTOMATED COUNT: 12.3 % (ref 11–15)
HCT VFR BLD AUTO: 38.9 % (ref 35–45)
HGB BLD-MCNC: 12.8 G/DL (ref 11.7–15.5)
LYMPHOCYTES # BLD AUTO: 2344 CELLS/UL (ref 850–3900)
LYMPHOCYTES NFR BLD AUTO: 21.5 %
MCH RBC QN AUTO: 29.3 PG (ref 27–33)
MCHC RBC AUTO-ENTMCNC: 32.9 G/DL (ref 32–36)
MCV RBC AUTO: 89 FL (ref 80–100)
MONOCYTES # BLD AUTO: 665 CELLS/UL (ref 200–950)
MONOCYTES NFR BLD AUTO: 6.1 %
NEUTROPHILS # BLD AUTO: 7576 CELLS/UL (ref 1500–7800)
NEUTROPHILS NFR BLD AUTO: 69.5 %
PLATELET # BLD AUTO: 381 THOUSAND/UL (ref 140–400)
PMV BLD REES-ECKER: 9.4 FL (ref 7.5–12.5)
RBC # BLD AUTO: 4.37 MILLION/UL (ref 3.8–5.1)
WBC # BLD AUTO: 10.9 THOUSAND/UL (ref 3.8–10.8)

## 2025-04-17 ENCOUNTER — OFFICE VISIT (OUTPATIENT)
Dept: OBGYN CLINIC | Facility: CLINIC | Age: 45
End: 2025-04-17
Payer: COMMERCIAL

## 2025-04-17 ENCOUNTER — TELEPHONE (OUTPATIENT)
Age: 45
End: 2025-04-17

## 2025-04-17 VITALS
SYSTOLIC BLOOD PRESSURE: 112 MMHG | HEIGHT: 66 IN | DIASTOLIC BLOOD PRESSURE: 68 MMHG | WEIGHT: 162.8 LBS | BODY MASS INDEX: 26.16 KG/M2

## 2025-04-17 DIAGNOSIS — N61.0 INFECTION OF LEFT BREAST: ICD-10-CM

## 2025-04-17 DIAGNOSIS — N63.42 SUBAREOLAR MASS OF LEFT BREAST: Primary | ICD-10-CM

## 2025-04-17 PROCEDURE — 99212 OFFICE O/P EST SF 10 MIN: CPT | Performed by: NURSE PRACTITIONER

## 2025-04-17 NOTE — PROGRESS NOTES
[facility]  [address] [place] [address]    Assessment/Plan:  1. Subareolar mass of left breast  2. Infection of left breast    Assessment & Plan  Subareolar mass of left breast  Ultrasound not yet scheduled, advised to call today.       Infection of left breast  On day 3 of antibiotic treatment, slightly improved.   Continue Keflex 500 mg PO QID  Discussed need to go to ED for evaluation if not improving or for worsening or new symptoms such as fever. Pt verbalizes understanding and agrees.   Return visit in 1 week.  Reviewed plan with Dr. Villanueva, agrees.             Subjective:   Sarah Barahona is a 44 y.o.  female.    HPI:   44 year old female presents for office visit for follow up after starting antibiotic treatment for infection of left breast. Pt currently on day 3 of treatment with Keflex and reports decreased tenderness and pain since starting. She is not having any fevers and has a normal appetite. She reports not feeling well last PM and went to bed. Woke this morning feeling much improved.         Gyn History  Patient's last menstrual period was 2025 (exact date).       Last pap smear: Not on file    She  reports being sexually active.       OB History      Past Medical History:  No date: Colon polyps  No date: Fibroadenoma of breast  No date: GERD (gastroesophageal reflux disease)  No date: Perioral dermatitis      Comment:  face     Past Surgical History:  2010: BREAST SURGERY; Right      Comment:  fibroadenoma  No date: CHOLECYSTECTOMY  No date: COLONOSCOPY  2021: ENDOSCOPIC ULTRASOUND (UPPER)      Comment:  Need follow up  No date: UPPER GASTROINTESTINAL ENDOSCOPY     Social History     Tobacco Use    Smoking status: Never    Smokeless tobacco: Never   Vaping Use    Vaping status: Never Used   Substance Use Topics    Alcohol use: Never    Drug use: Never          Current Outpatient Medications:     cephalexin (KEFLEX) 500 mg capsule, Take 1 capsule  "(500 mg total) by mouth every 6 (six) hours for 10 days, Disp: 40 capsule, Rfl: 0    esomeprazole (NexIUM) 20 mg capsule, Take 1 capsule (20 mg total) by mouth every morning Open the capsule into a spoonful of applesauce/yogurt/pudding and take 30 min prior to your first meal of the day (Patient not taking: Reported on 3/11/2025), Disp: 30 capsule, Rfl: 1    She is allergic to corn oil - food allergy - food allergy, diclegis [doxylamine-pyridoxine], doxycycline, eggs or egg-derived products - food allergy, nyamyc [nystatin], peanut oil - food allergy, prilosec [omeprazole], and shellfish allergy - food allergy..    ROS: Review of Systems See HPI for details otherwise negative.     Objective:  /68 (BP Location: Left arm, Patient Position: Sitting, Cuff Size: Standard)   Ht 5' 6\" (1.676 m)   Wt 73.8 kg (162 lb 12.8 oz)   LMP 04/09/2025 (Exact Date)   BMI 26.28 kg/m²      Physical Exam  Constitutional:       General: She is not in acute distress.     Appearance: Normal appearance. She is not ill-appearing.   HENT:      Head: Normocephalic and atraumatic.   Pulmonary:      Effort: Pulmonary effort is normal.   Chest:          Comments: Persistent swelling noted beneath left areola, slightly less since prior to antibiotic treatment, mildly tender, no erythema.  Musculoskeletal:         General: No swelling.   Skin:     General: Skin is warm and dry.   Neurological:      Mental Status: She is alert.   Psychiatric:         Thought Content: Thought content normal.           "

## 2025-04-17 NOTE — PATIENT INSTRUCTIONS
Please schedule your ultrasound of left breast.   Go to the emergency room if you have any worsening of symptoms or if you are having a fever or are feeling generally unwell.

## 2025-04-17 NOTE — TELEPHONE ENCOUNTER
Incoming call from patient - missed appointment this morning because she thought it was an hour later than scheduled. Patient calling to reschedule. Rescheduled as requested for this afternoon.

## 2025-04-24 ENCOUNTER — OFFICE VISIT (OUTPATIENT)
Dept: OBGYN CLINIC | Facility: CLINIC | Age: 45
End: 2025-04-24
Payer: COMMERCIAL

## 2025-04-24 VITALS
BODY MASS INDEX: 26.39 KG/M2 | DIASTOLIC BLOOD PRESSURE: 62 MMHG | WEIGHT: 164.2 LBS | SYSTOLIC BLOOD PRESSURE: 110 MMHG | HEIGHT: 66 IN

## 2025-04-24 DIAGNOSIS — N61.0 INFECTION OF LEFT BREAST: Primary | ICD-10-CM

## 2025-04-24 DIAGNOSIS — N63.42 SUBAREOLAR MASS OF LEFT BREAST: ICD-10-CM

## 2025-04-24 PROCEDURE — 99213 OFFICE O/P EST LOW 20 MIN: CPT | Performed by: NURSE PRACTITIONER

## 2025-04-24 RX ORDER — CEPHALEXIN 500 MG/1
500 CAPSULE ORAL EVERY 6 HOURS SCHEDULED
Qty: 20 CAPSULE | Refills: 0 | Status: SHIPPED | OUTPATIENT
Start: 2025-04-24 | End: 2025-04-29

## 2025-04-24 NOTE — PROGRESS NOTES
[facility] [place]  [address] [place] [address]    Assessment/Plan:  1. Infection of left breast  -     cephalexin (KEFLEX) 500 mg capsule; Take 1 capsule (500 mg total) by mouth every 6 (six) hours for 5 days  2. Subareolar mass of left breast    Assessment & Plan  Infection of left breast  Marked improvement after course of Keflex, will extend for a few more days as still with small palpable lump, however, no further tenderness and pt feeling well.   Breast ultrasound is scheduled for , trying to get in sooner.   Discussed need to call immediately if symptoms return, pt agrees.  Orders:    cephalexin (KEFLEX) 500 mg capsule; Take 1 capsule (500 mg total) by mouth every 6 (six) hours for 5 days    Subareolar mass of left breast       Breast ultrasound scheduled for 2025            Subjective:   Sarah Barahona is a 44 y.o.  female.    HPI:   44 year old female presents for office visit for follow up. Seen last week with very tender, painful lump in left breast. Treated with Keflex 500 mg PO QID. Pt states she is drastically improved since last visit, noticed on day 4 of antibiotics, woke up feeling much better. She has not had any fevers, chills, and reports a normal appetite. She denies pain and states that the breast is no longer tender at all and that she still feels a small lump but is nowhere near how it was initially. She is currently on her last day of antibiotics. She is tolerating them well, taking a probiotic, no concerns with diarrhea.         Gyn History  Patient's last menstrual period was 2025 (exact date).       Last pap smear: Not on file    She  reports being sexually active.       OB History      Past Medical History:  No date: Colon polyps  No date: Fibroadenoma of breast  No date: GERD (gastroesophageal reflux disease)  No date: Perioral dermatitis      Comment:  face     Past Surgical History:  2010: BREAST SURGERY; Right      Comment:   "fibroadenoma  No date: CHOLECYSTECTOMY  No date: COLONOSCOPY  12/2021: ENDOSCOPIC ULTRASOUND (UPPER)      Comment:  Need follow up  No date: UPPER GASTROINTESTINAL ENDOSCOPY     Social History     Tobacco Use    Smoking status: Never    Smokeless tobacco: Never   Vaping Use    Vaping status: Never Used   Substance Use Topics    Alcohol use: Never    Drug use: Never          Current Outpatient Medications:     amoxicillin-clavulanate (AUGMENTIN) 875-125 mg per tablet, , Disp: , Rfl:     cephalexin (KEFLEX) 500 mg capsule, Take 1 capsule (500 mg total) by mouth every 6 (six) hours for 10 days, Disp: 40 capsule, Rfl: 0    cephalexin (KEFLEX) 500 mg capsule, Take 1 capsule (500 mg total) by mouth every 6 (six) hours for 5 days, Disp: 20 capsule, Rfl: 0    esomeprazole (NexIUM) 20 mg capsule, Take 1 capsule (20 mg total) by mouth every morning Open the capsule into a spoonful of applesauce/yogurt/pudding and take 30 min prior to your first meal of the day (Patient not taking: Reported on 3/11/2025), Disp: 30 capsule, Rfl: 1    She is allergic to corn oil - food allergy - food allergy, diclegis [doxylamine-pyridoxine], doxycycline, eggs or egg-derived products - food allergy, nyamyc [nystatin], peanut oil - food allergy, prilosec [omeprazole], and shellfish allergy - food allergy..    ROS: Review of Systems See HPI for details otherwise negative.    Objective:  /62 (BP Location: Left arm, Patient Position: Sitting, Cuff Size: Standard)   Ht 5' 6\" (1.676 m)   Wt 74.5 kg (164 lb 3.2 oz)   LMP 04/09/2025 (Exact Date)   BMI 26.50 kg/m²      Physical Exam  Constitutional:       General: She is not in acute distress.     Appearance: Normal appearance. She is not ill-appearing.   HENT:      Head: Normocephalic and atraumatic.   Pulmonary:      Effort: Pulmonary effort is normal.   Chest:   Breasts:     Right: Normal. No swelling, mass, skin change or tenderness.      Left: Mass present. No swelling, skin change or " tenderness.          Comments: Very small lump palpable beneath left areola, markedly decreased from previous exams, no tenderness, no erythema. Approximately 1 cm.  Musculoskeletal:         General: No swelling.   Skin:     General: Skin is warm and dry.   Neurological:      Mental Status: She is alert.   Psychiatric:         Thought Content: Thought content normal.